# Patient Record
Sex: MALE | Race: WHITE | Employment: FULL TIME | URBAN - METROPOLITAN AREA
[De-identification: names, ages, dates, MRNs, and addresses within clinical notes are randomized per-mention and may not be internally consistent; named-entity substitution may affect disease eponyms.]

---

## 2018-06-27 PROBLEM — E66.01 OBESITY, MORBID (HCC): Status: ACTIVE | Noted: 2018-06-27

## 2020-10-26 RX ORDER — ATORVASTATIN CALCIUM 40 MG/1
TABLET, FILM COATED ORAL
Qty: 90 TAB | Refills: 3 | Status: SHIPPED | OUTPATIENT
Start: 2020-10-26 | End: 2021-10-21

## 2021-09-21 ENCOUNTER — OFFICE VISIT (OUTPATIENT)
Dept: INTERNAL MEDICINE CLINIC | Age: 66
End: 2021-09-21
Payer: OTHER GOVERNMENT

## 2021-09-21 VITALS
SYSTOLIC BLOOD PRESSURE: 180 MMHG | HEART RATE: 74 BPM | DIASTOLIC BLOOD PRESSURE: 100 MMHG | RESPIRATION RATE: 20 BRPM | TEMPERATURE: 97.3 F | WEIGHT: 279 LBS | HEIGHT: 69 IN | BODY MASS INDEX: 41.32 KG/M2 | OXYGEN SATURATION: 96 %

## 2021-09-21 DIAGNOSIS — E78.2 MIXED HYPERLIPIDEMIA: ICD-10-CM

## 2021-09-21 DIAGNOSIS — R63.5 WEIGHT GAIN: ICD-10-CM

## 2021-09-21 DIAGNOSIS — I10 HTN (HYPERTENSION), MALIGNANT: ICD-10-CM

## 2021-09-21 DIAGNOSIS — E66.01 MORBID OBESITY (HCC): ICD-10-CM

## 2021-09-21 DIAGNOSIS — C61 PROSTATE CANCER (HCC): ICD-10-CM

## 2021-09-21 DIAGNOSIS — E11.65 UNCONTROLLED TYPE 2 DIABETES MELLITUS WITH HYPERGLYCEMIA (HCC): ICD-10-CM

## 2021-09-21 DIAGNOSIS — G47.33 OBSTRUCTIVE SLEEP APNEA SYNDROME: ICD-10-CM

## 2021-09-21 DIAGNOSIS — Z00.00 ANNUAL PHYSICAL EXAM: Primary | ICD-10-CM

## 2021-09-21 LAB — HBA1C MFR BLD HPLC: 9 %

## 2021-09-21 PROCEDURE — 82044 UR ALBUMIN SEMIQUANTITATIVE: CPT | Performed by: INTERNAL MEDICINE

## 2021-09-21 PROCEDURE — 3052F HG A1C>EQUAL 8.0%<EQUAL 9.0%: CPT | Performed by: INTERNAL MEDICINE

## 2021-09-21 PROCEDURE — 99215 OFFICE O/P EST HI 40 MIN: CPT | Performed by: INTERNAL MEDICINE

## 2021-09-21 PROCEDURE — 99396 PREV VISIT EST AGE 40-64: CPT | Performed by: INTERNAL MEDICINE

## 2021-09-21 PROCEDURE — 83036 HEMOGLOBIN GLYCOSYLATED A1C: CPT | Performed by: INTERNAL MEDICINE

## 2021-09-21 RX ORDER — HYDROCHLOROTHIAZIDE 25 MG/1
25 TABLET ORAL DAILY
Qty: 90 TABLET | Refills: 1 | Status: SHIPPED | OUTPATIENT
Start: 2021-09-21 | End: 2022-01-12 | Stop reason: SDUPTHER

## 2021-09-21 RX ORDER — METFORMIN HYDROCHLORIDE 1000 MG/1
1000 TABLET ORAL 2 TIMES DAILY WITH MEALS
Qty: 60 TABLET | Refills: 3 | Status: SHIPPED | OUTPATIENT
Start: 2021-09-21 | End: 2022-01-12 | Stop reason: SDUPTHER

## 2021-09-21 RX ORDER — AMLODIPINE BESYLATE AND BENAZEPRIL HYDROCHLORIDE 10; 40 MG/1; MG/1
1 CAPSULE ORAL DAILY
Qty: 90 CAPSULE | Refills: 1 | Status: SHIPPED | OUTPATIENT
Start: 2021-09-21 | End: 2022-01-12 | Stop reason: SDUPTHER

## 2021-09-21 NOTE — PROGRESS NOTES
Fingerstick for HBa1C done in right  middle finger by Emily Her LPN per order of Celi Barone MD after cleaning area with alcohol wipe. Patient tolerated procedure well.

## 2021-09-21 NOTE — PROGRESS NOTES
1. Annual physical exam  Annual physical exam was performed. He had a colonoscopy just a few years ago which was normal.  All of his vaccines are up-to-date. He just had his PSA checked we will check a CBC  - CBC WITH AUTOMATED DIFF    2. Malignant HTN  This is a fairly urgent diagnosis which requires further work-up. He has been off of his blood pressure meds for quite some time. Given the increased episodes of headaches he is having end-diastolic pressures of 202 and greater he needs to be placed emergently back on his medications. I am also checking an EKG. We will start Lotrel 10/40 mg daily which she is supposed to start tonight at 50% of the dose. Restart hydrochlorothiazide as well. We will get him back in the office for blood pressure check as well. Of note I personally explained to him that I do want to avoid a precipitous drop in blood pressure. He is going to start half of his benazepril daily starting tonight he will then add hydrochlorothiazide and take these for 3 days. He will then increase to a full dose of the benazepril  - METABOLIC PANEL, COMPREHENSIVE  - EKG, 12 LEAD, INITIAL; Future  - amLODIPine-benazepril (LOTREL) 10-40 mg per capsule; Take 1 Capsule by mouth daily. Dispense: 90 Capsule; Refill: 1  - hydroCHLOROthiazide (HYDRODIURIL) 25 mg tablet; Take 1 Tablet by mouth daily. Dispense: 90 Tablet; Refill: 1    3. Obstructive sleep apnea syndrome  Also has a possibility of what might be contributing to his headaches could be the fact that his CPAP settings need to be readjusted. He is going to reach out to pulmonary    4. Mixed hyperlipidemia  Control unknown. Check lipid profile  - LIPID PANEL    5. Uncontrolled type 2 diabetes mellitus with hyperglycemia ([facility] [address]  I believe this is a new diagnosis. Given his weight gain of 20+ pounds and his not taking Metformin he is clearly crossed over into diabetes.   We will reassess this further at our follow-up but in the meantime we will start Januvia 50 mg daily as well as Metformin 1 g twice daily hemoglobin A1c was 9  - AMB POC HEMOGLOBIN A1C  - AMB POC URINE, MICROALBUMIN, SEMIQUANTITATIVE  - metFORMIN (GLUCOPHAGE) 1,000 mg tablet; Take 1 Tablet by mouth two (2) times daily (with meals). Dispense: 60 Tablet; Refill: 3  - SITagliptin (JANUVIA) 50 mg tablet; Take 1 Tablet by mouth daily. Dispense: 30 Tablet; Refill: 3  - COLLECTION CAPILLARY BLOOD SPECIMEN    6. Morbid obesity ([address]  He is started seeing a nutritionist.  I am to check a TSH given his weight gain    7. Weight gain  TSH  - TSH 3RD GENERATION    8. Prostate cancer [facility])  The patient is status post prostatectomy with nearly undetectable PSAs. This will need to be monitored yearly    Urology note from thierry dubon 7/20/21 reviewed by me  Personally interpreted an EKG that was performed on December 18, 2015 my reading is as follows normal sinus rhythm rate of 75 no ST segment changes no unusual Q waves  Labs from numerous encounters reviewed by me  Results for Eloy Torres (MRN [id number]) as of 9/21/2021 16:58   Ref.  Range 4/5/2019 13:11 7/6/2020 13:52 7/13/2020 15:05 7/5/2021 00:00 9/21/2021 16:40   Specific gravity (UA POC) Latest Ref Range: 1.001 - 1.035  1.025  1.025     pH (UA POC) Latest Ref Range: 4.6 - 8.0  6.0  8.0     Protein (UA POC) Latest Ref Range: Negative  Negative  Negative     Glucose (UA POC) Latest Ref Range: Negative  Negative  Negative     Ketones (UA POC) Latest Ref Range: Negative  Trace  Negative     Blood (UA POC) Latest Ref Range: Negative  Negative  Negative     Bilirubin (UA POC) Latest Ref Range: Negative  Negative  Negative     Urobilinogen (UA POC) Latest Ref Range: 0.2 - 1  1 mg/dL  2 mg/dL     Nitrites (UA POC) Latest Ref Range: Negative  Negative  Negative     Leukocyte esterase (UA POC) Latest Ref Range: Negative  Negative  Trace     Hemoglobin A1c (POC) Latest Units: %     9.0   Prostate Specific Ag Latest Ref Range: 0.0 - 4.0 ng/mL <0.03 <0.014 <0.014    CYTOLOGY NON GYN, UROLOGY [facility] Unknown Rpt       PROSTATE SPECIFIC ANTIGEN, TOTAL (PSA) Unknown Rpt         Chief Complaint   Patient presents with    Elbow Pain    uncontrolled htn, annual physical, hld, elevated blood suger, weight gain    HPI   This is a 82-year-old gentleman with a history of morbid obesity and obstructive sleep apnea as well as hypertension who was lost to follow-up. I last saw him in April 2020. In that period of time he has allowed his blood pressure medications to run out. He has gained at least 20 to 25 pounds as well. He was originally placed on the schedule for a brief sick visit but upon realizing how long it had been since he followed up we changed this to a new patient appointment. The patient reports that he has been having headaches on and off now for months. He suspected it might be his blood pressure. They did start after he stopped his blood pressure medicines. He has a history of postnasal drip and chronic sinusitis which he thought might be contributing to it the headache has 2 components one is a frontal headache that goes to the mid part of his skull. The second headache is a headache that is located over his trapezius muscles. The latter presents itself when he wakes from sleep. He continues to use a CPAP for his obstructive sleep apnea but has not had the settings checked in a very long time. He denies any chest pain palpitations shortness of breath. He does report he sweats a lot. He denies any swelling in his legs or recent injuries to his legs. He has some mild chronic left elbow pain which he has had on and off for years. He otherwise reports he has been doing okay. He last saw his urologist a couple months ago. His PSA at that time was nearly undetectable.   Patient Active Problem List   Diagnosis Code    Prostate cancer [facility]) C61    Erectile dysfunction following radical prostatectomy N52.31    Hayfever J30.1    Hypertension I10    Pneumonia J18.9    Sleep apnea G47.30    CONNIE (stress urinary incontinence), male N39.3    Obesity, morbid ([facility]) E66.01        Current Outpatient Medications on File Prior to Visit   Medication Sig Dispense Refill    atorvastatin (LIPITOR) 40 mg tablet TAKE 1 TABLET DAILY 90 Tab 3    beclomethasone dipropionate (QNASL) 80 mcg/actuation HFAA by Nasal route.  fexofenadine (ALLEGRA) 60 mg tablet Take  by mouth daily.  aspirin 81 mg tablet Take 81 mg by mouth.  Omega-3-DHA-EPA-Fish Oil 1,000 (120-180) mg Cap Take  by mouth.  atenolol (TENORMIN) 25 mg tablet  (Patient not taking: Reported on 9/21/2021)       No current facility-administered medications on file prior to visit. ROS  - GEN: +weight gain, no fevers or chills  - HEENT: no vision changes, no tinnitus, no sore throat  - CV: no cp, palpitations or edema  - RESP: no sob, cough  - ABD: no n/v/d, no blood in stool  - : no dysuria or changes in freq. - SKIN: no rashes, ulcers  - Neuro: no resting tremors, parasthesia in extremities, + headaches  - MS: No weakness in extremities, no gait abnormalities  - Psych: negative for depression or anxiety      Visit Vitals  BP (!) 180/100   Pulse 74   Temp 97.3 °F (36.3 °C)   Resp 20   Ht 5' 9\" (1.753 m)   Wt 279 lb (126.6 kg)   SpO2 96%   BMI 41.20 kg/m²           Physical Exam  Constitutional:       Appearance: Normal appearance. Morbidly obese. NAD and pleasant  HENT:      Head: Normocephalic. Nose: Nose normal.      Mouth/Throat:      Mouth: Mucous membranes are moist. Throat not inflammed  Eyes:      Extraocular Movements: Extraocular movements intact. Conjunctiva/sclera: Conjunctivae normal. Sclera anicteric     Pupils: Pupils are equal, round, and reactive to light. Cardiovascular:      Rate and Rhythm: Normal rate and regular rhythm. Pulses: Normal pulses. Pulmonary:      Effort: No respiratory distress. Breath sounds: CTAB and No stridor.  No rhonchi. Abdominal:      General: There is no distension. NT, ND  Neurological:      Mental Status: patient is alert and oriented times 3.  No resting tremor, normal gait     Cranial Nerves: cranial nerves grossly intact  Muskuloskeletal     Full ROM in extremities     Normal gait  Skin     Dry without lesions on examined areas, warm to the touch       Deferred  Psychiatry     Calm, normal affect, interacting normally

## 2021-09-21 NOTE — PROGRESS NOTES
Left elbow pain since June as well as headaches. Would like to have his prostate monitored. Karyn Elizondo presents today for   Chief Complaint   Patient presents with    Elbow Pain       Is someone accompanying this pt? noIs the patient using any DME equipment during OV? no    Depression Screening:  3 most recent PHQ Screens 9/21/2021   Little interest or pleasure in doing things Not at all   Feeling down, depressed, irritable, or hopeless More than half the days   Total Score PHQ 2 2       Learning Assessment:  No flowsheet data found. Fall Risk  No flowsheet data found. ADL  ADL Assessment 9/21/2021   Feeding yourself No Help Needed   Getting from bed to chair No Help Needed   Getting dressed No Help Needed   Bathing or showering No Help Needed   Walk across the room (includes cane/walker) No Help Needed   Using the telphone No Help Needed   Taking your medications No Help Needed   Preparing meals No Help Needed   Managing money (expenses/bills) No Help Needed   Moderately strenuous housework (laundry) No Help Needed   Shopping for personal items (toiletries/medicines) No Help Needed   Shopping for groceries No Help Needed   Driving No Help Needed   Climbing a flight of stairs No Help Needed   Getting to places beyond walking distances No Help Needed       Health Maintenance reviewed and discussed and ordered per Provider. Health Maintenance Due   Topic Date Due    Hepatitis C Screening  Never done    Lipid Screen  Never done    DTaP/Tdap/Td series (1 - Tdap) Never done    Colorectal Cancer Screening Combo  Never done    Shingrix Vaccine Age 50> (1 of 2) Never done    Flu Vaccine (1) Never done   . Coordination of Care:  1. Have you been to the ER, urgent care clinic since your last visit? Hospitalized since your last visit? no    2. Have you seen or consulted any other health care providers outside of the [address] [place] [address] since your last visit?  Include any pap smears or colon screening.  no

## 2021-09-22 LAB
CREATININE, URINE POC: NORMAL MG/DL
MICROALBUMIN UR TEST STR-MCNC: NORMAL MG/L
MICROALBUMIN/CREAT RATIO POC: NORMAL MG/G

## 2021-09-24 ENCOUNTER — HOSPITAL ENCOUNTER (OUTPATIENT)
Dept: NON INVASIVE DIAGNOSTICS | Age: 66
Discharge: HOME | End: 2021-09-24
Payer: OTHER GOVERNMENT

## 2021-09-24 DIAGNOSIS — I10 HTN (HYPERTENSION), MALIGNANT: ICD-10-CM

## 2021-09-24 LAB
ATRIAL RATE: 77 BPM
CALCULATED P AXIS, ECG09: 28 DEGREES
CALCULATED R AXIS, ECG10: 10 DEGREES
CALCULATED T AXIS, ECG11: 33 DEGREES
DIAGNOSIS, 93000: NORMAL
P-R INTERVAL, ECG05: 170 MS
Q-T INTERVAL, ECG07: 362 MS
QRS DURATION, ECG06: 88 MS
QTC CALCULATION (BEZET), ECG08: 409 MS
VENTRICULAR RATE, ECG03: 77 BPM

## 2021-09-24 PROCEDURE — 93005 ELECTROCARDIOGRAM TRACING: CPT

## 2021-09-25 ENCOUNTER — HOSPITAL ENCOUNTER (OUTPATIENT)
Dept: LAB | Age: 66
Discharge: HOME | End: 2021-09-25
Payer: OTHER GOVERNMENT

## 2021-09-25 LAB
ALBUMIN SERPL-MCNC: 4.1 G/DL (ref 3.5–4.7)
ALBUMIN/GLOB SERPL: 1.1 {RATIO}
ALP SERPL-CCNC: 72 U/L (ref 38–126)
ALT SERPL-CCNC: 54 U/L (ref 3–72)
ANION GAP SERPL CALC-SCNC: 10 MMOL/L
AST SERPL W P-5'-P-CCNC: 38 U/L (ref 17–74)
BASOPHILS # BLD: 0.1 K/UL (ref 0–0.1)
BASOPHILS NFR BLD: 1 % (ref 0–2)
BILIRUB SERPL-MCNC: 1.1 MG/DL (ref 0.2–1)
BUN SERPL-MCNC: 17 MG/DL (ref 9–21)
BUN/CREAT SERPL: 17
CA-I BLD-MCNC: 9.3 MG/DL (ref 8.5–10.5)
CHLORIDE SERPL-SCNC: 99 MMOL/L (ref 94–111)
CO2 SERPL-SCNC: 28 MMOL/L (ref 21–33)
CREAT SERPL-MCNC: 1 MG/DL (ref 0.8–1.5)
DIFFERENTIAL METHOD BLD: ABNORMAL
EOSINOPHIL # BLD: 0.2 K/UL (ref 0–0.4)
EOSINOPHIL NFR BLD: 2 % (ref 0–5)
ERYTHROCYTE [DISTWIDTH] IN BLOOD BY AUTOMATED COUNT: 13.4 % (ref 11.6–14.5)
GLOBULIN SER CALC-MCNC: 3.6 G/DL
GLUCOSE SERPL-MCNC: 100 MG/DL (ref 70–110)
HCT VFR BLD AUTO: 51.7 % (ref 36–48)
HGB BLD-MCNC: 17.1 G/DL (ref 13–16)
IMM GRANULOCYTES # BLD AUTO: 0 K/UL (ref 0–0.04)
IMM GRANULOCYTES NFR BLD AUTO: 0 % (ref 0–0.5)
LYMPHOCYTES # BLD: 2.6 K/UL (ref 0.9–3.6)
LYMPHOCYTES NFR BLD: 33 % (ref 21–52)
MCH RBC QN AUTO: 29.4 PG (ref 24–34)
MCHC RBC AUTO-ENTMCNC: 33.1 G/DL (ref 31–37)
MCV RBC AUTO: 88.8 FL (ref 78–100)
MONOCYTES # BLD: 1 K/UL (ref 0.05–1.2)
MONOCYTES NFR BLD: 12 % (ref 3–10)
NEUTS SEG # BLD: 4 K/UL (ref 1.8–8)
NEUTS SEG NFR BLD: 52 % (ref 40–73)
NRBC # BLD: 0 K/UL (ref 0–0.01)
NRBC BLD-RTO: 0 PER 100 WBC
PLATELET # BLD AUTO: 233 K/UL (ref 135–420)
PMV BLD AUTO: 10.8 FL (ref 9.2–11.8)
POTASSIUM SERPL-SCNC: 3.8 MMOL/L (ref 3.2–5.1)
PROT SERPL-MCNC: 7.7 G/DL (ref 6.1–8.4)
RBC # BLD AUTO: 5.82 M/UL (ref 4.35–5.65)
SODIUM SERPL-SCNC: 137 MMOL/L (ref 135–145)
TSH SERPL DL<=0.05 MIU/L-ACNC: 1.36 UIU/ML (ref 0.35–6.2)
WBC # BLD AUTO: 7.8 K/UL (ref 4.6–13.2)

## 2021-09-25 PROCEDURE — 36415 COLL VENOUS BLD VENIPUNCTURE: CPT

## 2021-09-25 PROCEDURE — 84443 ASSAY THYROID STIM HORMONE: CPT

## 2021-09-25 PROCEDURE — 80061 LIPID PANEL: CPT

## 2021-09-25 PROCEDURE — 85025 COMPLETE CBC W/AUTO DIFF WBC: CPT

## 2021-09-25 PROCEDURE — 80053 COMPREHEN METABOLIC PANEL: CPT

## 2021-09-26 LAB
CHOLEST SERPL-MCNC: 89 MG/DL
HDLC SERPL-MCNC: 21 MG/DL (ref 40–60)
HDLC SERPL: 4.2 {RATIO} (ref 0–5)
LDLC SERPL CALC-MCNC: 36.2 MG/DL (ref 0–100)
LIPID PROFILE,FLP: ABNORMAL
TRIGL SERPL-MCNC: 159 MG/DL (ref ?–150)
VLDLC SERPL CALC-MCNC: 31.8 MG/DL

## 2021-09-29 ENCOUNTER — TELEPHONE (OUTPATIENT)
Dept: INTERNAL MEDICINE CLINIC | Age: 66
End: 2021-09-29

## 2021-09-29 NOTE — TELEPHONE ENCOUNTER
Patient spoke with his pulmonary Doctor and he stated patient does not need to change any of his settings with his CPAP machine bc he is only having 1 episode per hour.

## 2021-10-12 ENCOUNTER — OFFICE VISIT (OUTPATIENT)
Dept: INTERNAL MEDICINE CLINIC | Age: 66
End: 2021-10-12
Payer: OTHER GOVERNMENT

## 2021-10-12 VITALS
DIASTOLIC BLOOD PRESSURE: 83 MMHG | BODY MASS INDEX: 39.69 KG/M2 | SYSTOLIC BLOOD PRESSURE: 137 MMHG | TEMPERATURE: 97.6 F | RESPIRATION RATE: 20 BRPM | WEIGHT: 268 LBS | HEART RATE: 78 BPM | OXYGEN SATURATION: 95 % | HEIGHT: 69 IN

## 2021-10-12 DIAGNOSIS — E66.9 OBESITY (BMI 35.0-39.9 WITHOUT COMORBIDITY): ICD-10-CM

## 2021-10-12 DIAGNOSIS — R94.31 ABNORMAL EKG: ICD-10-CM

## 2021-10-12 DIAGNOSIS — E11.65 UNCONTROLLED TYPE 2 DIABETES MELLITUS WITH HYPERGLYCEMIA (HCC): ICD-10-CM

## 2021-10-12 DIAGNOSIS — I10 ESSENTIAL HYPERTENSION: Primary | ICD-10-CM

## 2021-10-12 PROCEDURE — 99214 OFFICE O/P EST MOD 30 MIN: CPT | Performed by: INTERNAL MEDICINE

## 2021-10-12 PROCEDURE — 3052F HG A1C>EQUAL 8.0%<EQUAL 9.0%: CPT | Performed by: INTERNAL MEDICINE

## 2021-10-12 NOTE — PROGRESS NOTES
1. Essential hypertension  Pressure is now controlled. Continue, benazepril, Norvasc, beta-blocker and hydrochlorothiazide. He will need a repeat CMP in approximately 3 months    2. Uncontrolled type 2 diabetes mellitus with hyperglycemia (HCC)  Globin A1c of 9 reviewed with patient. He does need a referral to podiatry which I have ordered nonfasting blood sugar here in the office is 87. He denies any sweats or nonmovement induced dizziness. I have asked that he keep glucose tablets on hand and watch for signs of hypoglycemia  - REFERRAL TO PODIATRY    3. Obesity (BMI 35.0-39.9 without comorbidity)  He is already lost almost 10 pounds and I congratulated him on this. 4. Abnormal EKG  I reviewed his EKG and personally interpreted it. It is abnormal.  Given his uncontrolled diabetes uncontrolled hypertension he is at risk for cardiac event. I am ordering a treadmill stress test  - NUCLEAR CARDIAC STRESS TEST; Future    Ekg My interpretation Rate 77, slight st elevation noted in inferior Leads. No pathologic q waves noted  Labs reviewed  Total time spent on this encounter was approximately 20 minutes. Approximately 15 minutes were spent at the bedside and the remainder of the time was reviewing his medical record  Chief Complaint   Patient presents with    Follow-up     4 week follow up        HPI   This is a delightful 79-year-old gentleman who presented to me originally 4 weeks ago at which time it was noted he had malignant hypertension and poorly controlled diabetes. Hemoglobin A1c indicated very poor blood glucose control. At that time he was started on Januvia and Metformin. Additionally his Norvasc/benazepril was restarted hydrochlorothiazide was added and a beta-blockade was initiated. Since then the patient reports he feels much better. His headaches are gone. He denies any chest pain palpitations or shortness of breath.   He reports that he is lost almost 10 pounds and part of it is that his wife is really forcing him to eat better. Quite frankly he reports he feels better than he has felt in a long time. He had one episode of dizziness when he went from a sitting position to a standing position but it passed fairly quickly. He denies any diaphoresis or other lightheadedness that would be consistent with hypoglycemia. He otherwise reports he has been doing well and has no other complaints. Patient Active Problem List   Diagnosis Code    Prostate cancer ([address] C61    Erectile dysfunction following radical prostatectomy N52.31    Hayfever J30.1    Hypertension I10    Pneumonia J18.9    Sleep apnea G47.30    CONNIE (stress urinary incontinence), male N39.3    Obesity, morbid ([address] E66.01        Current Outpatient Medications on File Prior to Visit   Medication Sig Dispense Refill    amLODIPine-benazepril (LOTREL) 10-40 mg per capsule Take 1 Capsule by mouth daily. 90 Capsule 1    hydroCHLOROthiazide (HYDRODIURIL) 25 mg tablet Take 1 Tablet by mouth daily. 90 Tablet 1    metFORMIN (GLUCOPHAGE) 1,000 mg tablet Take 1 Tablet by mouth two (2) times daily (with meals). 60 Tablet 3    SITagliptin (JANUVIA) 50 mg tablet Take 1 Tablet by mouth daily. 30 Tablet 3    atorvastatin (LIPITOR) 40 mg tablet TAKE 1 TABLET DAILY 90 Tab 3    atenolol (TENORMIN) 25 mg tablet       beclomethasone dipropionate (QNASL) 80 mcg/actuation HFAA by Nasal route.  fexofenadine (ALLEGRA) 60 mg tablet Take  by mouth daily.  aspirin 81 mg tablet Take 81 mg by mouth.  Omega-3-DHA-EPA-Fish Oil 1,000 (120-180) mg Cap Take  by mouth. No current facility-administered medications on file prior to visit.         ROS  - GEN: + weight loss, no fevers or chills  - HEENT: no vision changes, no tinnitus, no sore throat  - CV: no cp, palpitations or edema  - RESP: no sob, cough        - Neuro: no resting tremors, parasthesia in extremities, no headaches  - MS: No weakness in extremities, no gait abnormalities  - Psych: negative for depression or anxiety      Visit Vitals  /83   Pulse 78   Temp 97.6 °F (36.4 °C)   Resp 20   Ht 5' 9\" (1.753 m)   Wt 268 lb (121.6 kg)   SpO2 95%   BMI 39.58 kg/m²           Physical Exam  Constitutional:       Appearance: Normal appearance. obese. NAD and pleasant  HENT:      Head: Normocephalic. Nose: Nose normal.      Mouth/Throat:     Eyes:      Extraocular Movements: Extraocular movements intact. Conjunctiva/sclera: Conjunctivae normal. Sclera anicteric    Cardiovascular:      Rate and Rhythm: Normal rate and regular rhythm. Pulses: Normal pulses. Pulmonary:      Effort: No respiratory distress. Breath sounds: CTAB and No stridor. No rhonchi.

## 2021-10-12 NOTE — PROGRESS NOTES
Patient states he is feeling much better. Mary Lou Gomez presents today for   Chief Complaint   Patient presents with    Follow-up     4 week follow up       Is someone accompanying this pt? no  Is the patient using any DME equipment during [address]? no    Depression Screening:  3 most recent PHQ Screens 10/12/2021   Little interest or pleasure in doing things Not at all   Feeling down, depressed, irritable, or hopeless Not at all   Total Score PHQ 2 0       Learning Assessment:  No flowsheet data found. Fall Risk  No flowsheet data found. ADL  ADL Assessment 10/12/2021   Feeding yourself No Help Needed   Getting from bed to chair No Help Needed   Getting dressed No Help Needed   Bathing or showering No Help Needed   Walk across the room (includes cane/walker) No Help Needed   Using the telphone No Help Needed   Taking your medications No Help Needed   Preparing meals No Help Needed   Managing money (expenses/bills) No Help Needed   Moderately strenuous housework (laundry) No Help Needed   Shopping for personal items (toiletries/medicines) No Help Needed   Shopping for groceries No Help Needed   Driving No Help Needed   Climbing a flight of stairs No Help Needed   Getting to places beyond walking distances No Help Needed       Health Maintenance reviewed and discussed and ordered per Provider. Health Maintenance Due   Topic Date Due    Hepatitis C Screening  Never done    Foot Exam Q1  Never done    Eye Exam Retinal or Dilated  Never done    DTaP/Tdap/Td series (1 - Tdap) Never done    Colorectal Cancer Screening Combo  Never done    Shingrix Vaccine Age 50> (1 of 2) Never done   . Coordination of Care:  1. \"Have you been to the ER, urgent care clinic since your last visit? Hospitalized since your last visit? \" No    2. \"Have you seen or consulted any other health care providers outside of the [address] [place] [address] [place] since your last visit? \" No     3.  For patients aged 39-70: Has the patient had a colonoscopy?  yes

## 2021-10-15 ENCOUNTER — HOSPITAL ENCOUNTER (OUTPATIENT)
Dept: NUCLEAR MEDICINE | Age: 66
Discharge: HOME | End: 2021-10-15
Attending: INTERNAL MEDICINE
Payer: OTHER GOVERNMENT

## 2021-10-15 ENCOUNTER — HOSPITAL ENCOUNTER (OUTPATIENT)
Dept: NON INVASIVE DIAGNOSTICS | Age: 66
Discharge: HOME | End: 2021-10-15
Attending: INTERNAL MEDICINE
Payer: OTHER GOVERNMENT

## 2021-10-15 VITALS
DIASTOLIC BLOOD PRESSURE: 69 MMHG | BODY MASS INDEX: 38.95 KG/M2 | WEIGHT: 263 LBS | SYSTOLIC BLOOD PRESSURE: 130 MMHG | HEART RATE: 84 BPM | HEIGHT: 69 IN

## 2021-10-15 DIAGNOSIS — R94.31 ABNORMAL EKG: ICD-10-CM

## 2021-10-15 LAB
STRESS ANGINA INDEX: 0
STRESS BASELINE HR: 75 BPM
STRESS ESTIMATED WORKLOAD: 7 METS
STRESS EXERCISE DUR MIN: NORMAL
STRESS PEAK DIAS BP: 82 MMHG
STRESS PEAK SYS BP: 234 MMHG
STRESS PERCENT HR ACHIEVED: 94 %
STRESS POST PEAK HR: 148 BPM
STRESS RATE PRESSURE PRODUCT: NORMAL BPM*MMHG
STRESS SR DUKE TREADMILL SCORE: 0
STRESS ST DEPRESSION: 0 MM
STRESS ST ELEVATION: 0 MM
STRESS TARGET HR: 157 BPM

## 2021-10-15 PROCEDURE — 93017 CV STRESS TEST TRACING ONLY: CPT

## 2021-10-15 PROCEDURE — A9500 TC99M SESTAMIBI: HCPCS

## 2021-10-15 RX ORDER — TETRAKIS(2-METHOXYISOBUTYLISOCYANIDE)COPPER(I) TETRAFLUOROBORATE 1 MG/ML
35 INJECTION, POWDER, LYOPHILIZED, FOR SOLUTION INTRAVENOUS
Status: COMPLETED | OUTPATIENT
Start: 2021-10-15 | End: 2021-10-15

## 2021-10-15 RX ORDER — TETRAKIS(2-METHOXYISOBUTYLISOCYANIDE)COPPER(I) TETRAFLUOROBORATE 1 MG/ML
9.7 INJECTION, POWDER, LYOPHILIZED, FOR SOLUTION INTRAVENOUS
Status: COMPLETED | OUTPATIENT
Start: 2021-10-15 | End: 2021-10-15

## 2021-10-15 RX ADMIN — TETRAKIS(2-METHOXYISOBUTYLISOCYANIDE)COPPER(I) TETRAFLUOROBORATE 9.7 MILLICURIE: 1 INJECTION, POWDER, LYOPHILIZED, FOR SOLUTION INTRAVENOUS at 07:00

## 2021-10-15 RX ADMIN — TETRAKIS(2-METHOXYISOBUTYLISOCYANIDE)COPPER(I) TETRAFLUOROBORATE 35 MILLICURIE: 1 INJECTION, POWDER, LYOPHILIZED, FOR SOLUTION INTRAVENOUS at 11:00

## 2021-10-19 ENCOUNTER — TELEPHONE (OUTPATIENT)
Dept: INTERNAL MEDICINE CLINIC | Age: 66
End: 2021-10-19

## 2021-10-19 DIAGNOSIS — R94.39 ABNORMAL STRESS TEST: Primary | ICD-10-CM

## 2021-10-19 NOTE — TELEPHONE ENCOUNTER
----- Message from Lashay Singleton MD sent at 10/15/2021  4:35 PM EDT -----  His stress test does not show any acute ischemia. However it does show a potential perfusion deficit. It is very small and could just be artifact. That being said I would like to have him evaluated by cardiology. Please place a referral for [address] [place] [address] cardiology.   Diagnosis abnormal EKG and abnormal stress test

## 2021-10-21 RX ORDER — ATORVASTATIN CALCIUM 40 MG/1
TABLET, FILM COATED ORAL
Qty: 90 TABLET | Refills: 3 | Status: SHIPPED | OUTPATIENT
Start: 2021-10-21 | End: 2022-01-12 | Stop reason: SDUPTHER

## 2021-11-22 ENCOUNTER — OFFICE VISIT (OUTPATIENT)
Dept: CARDIOLOGY CLINIC | Age: 66
End: 2021-11-22
Payer: OTHER GOVERNMENT

## 2021-11-22 VITALS
HEIGHT: 69 IN | WEIGHT: 265 LBS | HEART RATE: 78 BPM | SYSTOLIC BLOOD PRESSURE: 126 MMHG | OXYGEN SATURATION: 96 % | BODY MASS INDEX: 39.25 KG/M2 | DIASTOLIC BLOOD PRESSURE: 70 MMHG

## 2021-11-22 DIAGNOSIS — R06.09 DOE (DYSPNEA ON EXERTION): Primary | ICD-10-CM

## 2021-11-22 DIAGNOSIS — I27.20 PULMONARY HTN (HCC): ICD-10-CM

## 2021-11-22 PROCEDURE — 99204 OFFICE O/P NEW MOD 45 MIN: CPT | Performed by: INTERNAL MEDICINE

## 2021-11-22 NOTE — PROGRESS NOTES
[facility]    Mr. Rizwana Tejeda is a 64-year male with a history of diabetes, hypertension, hyperlipidemia and prostate cancer    Patient is here today for cardiac evaluation. He denies any prior history of MI or CHF. Patient was sent to me for the evaluation of stress test finding abnormal results. Patient denies any resting or exertional chest pain or chest tightness. He denies any palpitation, presyncope or syncope. Recently patient has been diagnosed with diabetes and because of risk factor, he underwent stress test which was found to be abnormal for him to PCP and was sent to me. Patient is able to perform activity of daily living without any significant limitation. He does have some fatigue and tiredness after walking and mild dyspnea but does not appear to have any chest pain or chest tightness. He can climb 4 flight of stairs without having to stop. On and off he has some lower extremity swelling however has not had any significant swelling recently  Denies any nausea, vomiting, abdominal pain, fever, chills, sputum production. No hematuria or other bleeding complaints    Past Medical History:   Diagnosis Date    Hayfever     Hypertension     Malignant neoplasm prostate ([facility] 03-    Pneumonia 1985 or 1986    [facility]     [id number] CaP 3+3    Sleep apnea     CONNIE (stress urinary incontinence), male        Review of Systems:  Cardiac symptoms as noted above in HPI. All others negative. Current Outpatient Medications   Medication Sig    atorvastatin (LIPITOR) 40 mg tablet TAKE 1 TABLET DAILY    amLODIPine-benazepril (LOTREL) 10-40 mg per capsule Take 1 Capsule by mouth daily.  metFORMIN (GLUCOPHAGE) 1,000 mg tablet Take 1 Tablet by mouth two (2) times daily (with meals).  SITagliptin (JANUVIA) 50 mg tablet Take 1 Tablet by mouth daily.     atenolol (TENORMIN) 25 mg tablet     fexofenadine (ALLEGRA) 60 mg tablet Take  by mouth daily.  aspirin 81 mg tablet Take 81 mg by mouth.  Omega-3-DHA-EPA-Fish Oil 1,000 (120-180) mg Cap Take  by mouth.  hydroCHLOROthiazide (HYDRODIURIL) 25 mg tablet Take 1 Tablet by mouth daily.  beclomethasone dipropionate (QNASL) 80 mcg/actuation HFAA by Nasal route. No current facility-administered medications for this visit. Past Surgical History:   Procedure Laterality Date    BIOPSY PROSTATE  3/2/11    HX HERNIA REPAIR  12/2015    HX RADICAL PROSTATECTOMY  5/6/2011    [place] APPENDECTOMY  5/1977    VASECTOMY  1990 or 1991       Allergies and Sensitivities:  Allergies   Allergen Reactions    Hay Fever And Allergy Relief Runny Nose and Cough       Family History:  Family History   Problem Relation Age of Onset    Cancer Father         lung cancer    Prostate Cancer Father     Other Father         vision impaired    Hypertension Mother     Obesity Mother     Hearing Impairment Mother     Other Mother         vision impair    Hypertension Sister        Social History:  Social History     Tobacco Use    Smoking status: Never Smoker    Smokeless tobacco: Never Used   Vaping Use    Vaping Use: Never used   Substance Use Topics    Alcohol use: Yes     Comment: occ    Drug use: No     He  reports that he has never smoked. He has never used smokeless tobacco.  He  reports current alcohol use. Physical Exam:  BP Readings from Last 3 Encounters:   11/22/21 126/70   10/15/21 130/69   10/12/21 137/83         Pulse Readings from Last 3 Encounters:   11/22/21 78   10/15/21 84   10/12/21 78          Wt Readings from Last 3 Encounters:   11/22/21 120.2 kg (265 lb)   10/15/21 119.3 kg (263 lb)   10/12/21 121.6 kg (268 lb)       Constitutional: Oriented to person, place, and time. HENT: Head: Normocephalic and atraumatic. Eyes: Conjunctivae and extraocular motions are normal.   Neck: No JVD present. Carotid bruit is not appreciated.    Cardiovascular: Regular rhythm. No murmur, gallop or rubs appreciated  Lung: Breath sounds normal. No respiratory distress. No ronchi or rales appreciated  Abdominal: No tenderness. No rebound and no guarding. Musculoskeletal: There is no lower extremity edema. No cynosis  Lymphadenopathy:  No cervical or supraclavicular adenopathy appriciated. Neurological: No gross motor deficit noted. Skin: No visible skin rash noted. No Ear discharge noted    LABS:   @  Lab Results   Component Value Date/Time    WBC 7.8 2021 09:55 AM    HGB 17.1 (H) 2021 09:55 AM    HCT 51.7 (H) 2021 09:55 AM    PLATELET 649  09:55 AM    MCV 88.8 2021 09:55 AM     Lab Results   Component Value Date/Time    Sodium 137 2021 09:55 AM    Potassium 3.8 2021 09:55 AM    Chloride 99 2021 09:55 AM    CO2 28 2021 09:55 AM    Glucose 100 2021 09:55 AM    BUN 17 2021 09:55 AM    Creatinine 1.00 2021 09:55 AM     Lipids Latest Ref Rng & Units 2021   Chol, Total <200 mg/dL 89   HDL 40 - 60 mg/dL 21(L)   LDL 0 - 100 mg/dL 36.2   Trig <150 mg/dL 159(H)   Chol/HDL Ratio 0 - 5.0   4.2   Some recent data might be hidden     Lab Results   Component Value Date/Time    ALT (SGPT) 54 2021 09:55 AM     Lab Results   Component Value Date/Time    Hemoglobin A1c (POC) 9.0 2021 04:40 PM     Lab Results   Component Value Date/Time    TSH 1.36 2021 09:55 AM     EK2021: Sinus rhythm. No Q waves. Normal [place] and QRS interval.  Likely early repolarization    NUCLEAR CARDIAC STRESS TEST 10/15/2021, 10/15/2021 10/15/2021  Interpretation Summary  · Baseline ECG: Normal sinus rhythm, non-specific ST-T wave abnormalities. · Stress test: Stress EKG normal. Low risk Duke treadmill score. Exercise stress test: EKG stress test results correlate with an intermediate risk of inducible myocardial ischemia supported by the following factors: multiple CV risk factors.  Further cardiac evaluation for ischemic heart disease could be considered, especially in the setting of progressive or typical angina. · SPECT: Left ventricular function post-stress was normal. Calculated ejection fraction is 50% (normal EF value is equal to or greater than 50%). · SPECT: Myocardial perfusion imaging defect 1: There is a defect that is small in size of the inferior wall(s) that is predominantly fixed. There is normal wall motion in the defect area. The defect appears to be an artifact. · SPECT: Left ventricular perfusion is equivocal. Myocardial perfusion imaging supports a low risk stress test.    STRESS TEST (EST, PHARM, NUC, ECHO etc)    CATHETERIZATION    IMPRESSION & PLAN:  Mr. Simin Wilkins is a 82-year-old male    Hypertension:  /70. Currently on amlodipine, benazepril, hydrochlorothiazide and atenolol. Echo ordered to rule out hypertensive cardiovascular heart disease especially with some fatigue and dyspnea  Nonpharmacologic intervention for hypertension discussed with patient in detail especially salt restriction, weight loss, aerobic exercise and dietary modification. Hyperlipidemia:  Currently on atorvastatin 40 mg daily. Total cholesterol 89 and LDL was 36. Reasonable to consider decreasing dose to 20 mg    Diabetes:  Goal hemoglobin A1c less than 7 is recommended from cardiovascular standpoint. Last hemoglobin A1c was 9. Now patient is on Metformin    Sleep apnea:  Using CPAP machine. Weight loss advised. Will order echo to rule out pulmonary hypertension    Obesity:  Importance of diet and exercise was discussed with patient. Max weight was 285 and now with dietary modification is down to 265. He will continue to work on his diet and exercise program    This plan was discussed with patient who is in agreement. Thank you for allowing me to participate in patient care. Please feel free to call me if you have any question or concern. Tiera Adams MD  Please note:  This document has been produced using voice recognition software. Unrecognized errors in transcription may be present.

## 2021-11-22 NOTE — LETTER
11/22/2021    Patient: Felice Villa   YOB: 1955   Date of Visit: 11/22/2021     Liv Renae MD  [address] [facility] [address]  Via In Basket    Dear Liv Renae MD,      Thank you for referring Mr. Ron Abraham to [address] [place] [address] for evaluation. My notes for this consultation are attached. If you have questions, please do not hesitate to call me. I look forward to following your patient along with you.       Sincerely,    Rebeca Hunter MD

## 2021-11-24 ENCOUNTER — TELEPHONE (OUTPATIENT)
Dept: INTERNAL MEDICINE CLINIC | Age: 66
End: 2021-11-24

## 2021-11-24 DIAGNOSIS — E11.65 UNCONTROLLED TYPE 2 DIABETES MELLITUS WITH HYPERGLYCEMIA (HCC): Primary | ICD-10-CM

## 2021-11-24 NOTE — TELEPHONE ENCOUNTER
Patient called and states that his Januvia is 400+$ and wants a generic form sent. There is no generic for Januvia.

## 2021-11-30 NOTE — TELEPHONE ENCOUNTER
Is this supposed to be Jardiance that gets sent in? Alicia Melton is the one that is too expensive. Thank You.

## 2022-01-12 ENCOUNTER — VIRTUAL VISIT (OUTPATIENT)
Dept: INTERNAL MEDICINE CLINIC | Age: 67
End: 2022-01-12
Payer: OTHER GOVERNMENT

## 2022-01-12 DIAGNOSIS — I10 ESSENTIAL HYPERTENSION: Primary | ICD-10-CM

## 2022-01-12 DIAGNOSIS — I10 HTN (HYPERTENSION), MALIGNANT: ICD-10-CM

## 2022-01-12 DIAGNOSIS — G47.33 OBSTRUCTIVE SLEEP APNEA SYNDROME: ICD-10-CM

## 2022-01-12 DIAGNOSIS — E66.01 OBESITY, MORBID (HCC): ICD-10-CM

## 2022-01-12 DIAGNOSIS — E11.65 UNCONTROLLED TYPE 2 DIABETES MELLITUS WITH HYPERGLYCEMIA (HCC): ICD-10-CM

## 2022-01-12 DIAGNOSIS — E78.2 MIXED HYPERLIPIDEMIA: ICD-10-CM

## 2022-01-12 DIAGNOSIS — J30.1 SEASONAL ALLERGIC RHINITIS DUE TO POLLEN: ICD-10-CM

## 2022-01-12 PROCEDURE — 99443 PR PHYS/QHP TELEPHONE EVALUATION 21-30 MIN: CPT | Performed by: INTERNAL MEDICINE

## 2022-01-12 RX ORDER — HYDROCHLOROTHIAZIDE 25 MG/1
25 TABLET ORAL DAILY
Qty: 90 TABLET | Refills: 1 | Status: SHIPPED | OUTPATIENT
Start: 2022-01-12 | End: 2022-08-30 | Stop reason: SDUPTHER

## 2022-01-12 RX ORDER — FEXOFENADINE HCL 60 MG
180 TABLET ORAL DAILY
Qty: 90 TABLET | Refills: 1 | Status: SHIPPED | OUTPATIENT
Start: 2022-01-12

## 2022-01-12 RX ORDER — ATENOLOL 25 MG/1
25 TABLET ORAL DAILY
Qty: 90 TABLET | Refills: 1 | Status: SHIPPED | OUTPATIENT
Start: 2022-01-12 | End: 2022-09-29

## 2022-01-12 RX ORDER — AMLODIPINE BESYLATE AND BENAZEPRIL HYDROCHLORIDE 10; 40 MG/1; MG/1
1 CAPSULE ORAL DAILY
Qty: 90 CAPSULE | Refills: 1 | Status: SHIPPED | OUTPATIENT
Start: 2022-01-12 | End: 2022-08-30 | Stop reason: SDUPTHER

## 2022-01-12 RX ORDER — ATORVASTATIN CALCIUM 40 MG/1
TABLET, FILM COATED ORAL
Qty: 90 TABLET | Refills: 1 | Status: SHIPPED | OUTPATIENT
Start: 2022-01-12

## 2022-01-12 RX ORDER — METFORMIN HYDROCHLORIDE 1000 MG/1
1000 TABLET ORAL 2 TIMES DAILY WITH MEALS
Qty: 180 TABLET | Refills: 1 | Status: SHIPPED | OUTPATIENT
Start: 2022-01-12 | End: 2022-01-19

## 2022-01-12 NOTE — PROGRESS NOTES
I was at my office in [place], [place] while conducting this encounter. The patient is at home. Consent:  Patient and/or HIS/HER healthcare decision maker is aware that this patient-initiated Telehealth encounter is a billable service, with coverage as determined by patient's insurance carrier. Patient is aware that He/She may receive a bill and has provided verbal consent to proceed: Consent has been obtained within past 12 months of the date of this encounter. This virtual visit was conducted via Telephone    1. Uncontrolled type 2 diabetes mellitus with hyperglycemia (HCC)  His last hemoglobin A1c is 9 and he is due for recheck. He has been compliant with Jardiance and high-dose metformin. He is also working on his weight. I am going to recheck a hemoglobin A1c and a CMP  - empagliflozin (Jardiance) 25 mg tablet; Take 1 Tablet by mouth daily. Dispense: 90 Tablet; Refill: 1  - HEMOGLOBIN A1C WITH EAG  - METABOLIC PANEL, COMPREHENSIVE  - metFORMIN (GLUCOPHAGE) 1,000 mg tablet; Take 1 Tablet by mouth two (2) times daily (with meals). Dispense: 180 Tablet; Refill: 1    2. Essential hypertension  Continue Tenormin as well as Norvasc and benazepril and hydrochlorothiazide  - METABOLIC PANEL, COMPREHENSIVE  - atenoloL (TENORMIN) 25 mg tablet; Take 1 Tablet by mouth daily. Dispense: 90 Tablet; Refill: 1    3. Obesity, morbid ([address]  He has been working very hard on his weight and is already lost several pounds. He has an appointment tonight with nutrition    4. Obstructive sleep apnea syndrome  StAble    5. HTN (hypertension)  #2  - amLODIPine-benazepril (LOTREL) 10-40 mg per capsule; Take 1 Capsule by mouth daily. Dispense: 90 Capsule; Refill: 1    6. Mixed hyperlipidemia  His last lipid profile was exceptional.  Continue Lipitor at current dose  - atorvastatin (LIPITOR) 40 mg tablet; TAKE 1 TABLET DAILY  Dispense: 90 Tablet; Refill: 1    7. Seasonal allergic rhinitis due to pollen  Stable.   Continue Allegra  - fexofenadine (Allegra) 60 mg tablet; Take 3 Tablets by mouth daily. Dispense: 90 Tablet; Refill: 1       Cc: followup dm, htn,     HPI   This is a very pleasant 60-year-old gentleman with a history of type 2 diabetes dyslipidemia hypertension as well as seasonal rhinitis who presents for follow-up. Phillip Larsen has been doing very well over the past few months. When I last spoke to him in September he was found to have a hemoglobin A1c of 9. At that time we added Januvia 25 mg and made sure he was taking his high-dose metformin. Since then he reports he is doing much better. He is lost at least 7 pounds and has an appointment with nutrition today. He has no other major complaints except for some difficulty holding his bladder which is chronic for him. He denies any headache vision changes chest pain palpitation shortness of breath or cough. The patient reports that he is hoping to get an appointment with Abner duarte given that I am leaving next week. He otherwise has absolutely no other complaints today  Current Outpatient Medications on File Prior to Visit   Medication Sig Dispense Refill    hydroCHLOROthiazide (HYDRODIURIL) 25 mg tablet Take 1 Tablet by mouth daily. 90 Tablet 1    beclomethasone dipropionate (QNASL) 80 mcg/actuation HFAA by Nasal route.  aspirin 81 mg tablet Take 81 mg by mouth.  Omega-3-DHA-EPA-Fish Oil 1,000 (120-180) mg Cap Take  by mouth. No current facility-administered medications on file prior to visit.         Patient Active Problem List   Diagnosis Code    Prostate cancer ([facility] C61    Erectile dysfunction following radical prostatectomy N52.31    Hayfever J30.1    Hypertension I10    Pneumonia J18.9    Sleep apnea G47.30    CONNIE (stress urinary incontinence), male N39.3    Obesity, morbid ([facility] E66.01             Total Time Spent on this Encounter: total time spent was approx 21 minutes

## 2022-01-14 ENCOUNTER — TELEPHONE (OUTPATIENT)
Dept: INTERNAL MEDICINE CLINIC | Age: 67
End: 2022-01-14

## 2022-01-14 ENCOUNTER — HOSPITAL ENCOUNTER (OUTPATIENT)
Dept: LAB | Age: 67
Discharge: HOME | End: 2022-01-14
Payer: OTHER GOVERNMENT

## 2022-01-14 LAB
ALBUMIN SERPL-MCNC: 4.8 G/DL (ref 3.5–4.7)
ALBUMIN/GLOB SERPL: 1.2 {RATIO}
ALP SERPL-CCNC: 65 U/L (ref 38–126)
ALT SERPL-CCNC: 50 U/L (ref 3–72)
ANION GAP SERPL CALC-SCNC: 12 MMOL/L
AST SERPL W P-5'-P-CCNC: 34 U/L (ref 17–74)
BILIRUB SERPL-MCNC: 1.2 MG/DL (ref 0.2–1)
BUN SERPL-MCNC: 17 MG/DL (ref 9–21)
BUN/CREAT SERPL: 17
CA-I BLD-MCNC: 10 MG/DL (ref 8.5–10.5)
CHLORIDE SERPL-SCNC: 99 MMOL/L (ref 94–111)
CO2 SERPL-SCNC: 25 MMOL/L (ref 21–33)
CREAT SERPL-MCNC: 1 MG/DL (ref 0.8–1.5)
GLOBULIN SER CALC-MCNC: 3.9 G/DL
GLUCOSE SERPL-MCNC: 86 MG/DL (ref 70–110)
POTASSIUM SERPL-SCNC: 3.4 MMOL/L (ref 3.2–5.1)
PROT SERPL-MCNC: 8.7 G/DL (ref 6.1–8.4)
SODIUM SERPL-SCNC: 136 MMOL/L (ref 135–145)

## 2022-01-14 PROCEDURE — 36415 COLL VENOUS BLD VENIPUNCTURE: CPT

## 2022-01-14 PROCEDURE — 80053 COMPREHEN METABOLIC PANEL: CPT

## 2022-01-14 PROCEDURE — 83036 HEMOGLOBIN GLYCOSYLATED A1C: CPT

## 2022-01-16 LAB
EST. AVERAGE GLUCOSE BLD GHB EST-MCNC: 126 MG/DL
HBA1C MFR BLD: 6 % (ref 4.2–5.6)

## 2022-01-31 DIAGNOSIS — E11.65 UNCONTROLLED TYPE 2 DIABETES MELLITUS WITH HYPERGLYCEMIA (HCC): ICD-10-CM

## 2022-01-31 RX ORDER — METFORMIN HYDROCHLORIDE 1000 MG/1
1000 TABLET ORAL 2 TIMES DAILY WITH MEALS
Qty: 60 TABLET | Refills: 1 | Status: SHIPPED | OUTPATIENT
Start: 2022-01-31

## 2022-01-31 NOTE — TELEPHONE ENCOUNTER
Received fax from [address]  Last appt w/Dr. Hallie Vela 1/12/22 has upcoming appt w/Ms Solis on 4/4/22

## 2022-02-10 ENCOUNTER — TELEPHONE (OUTPATIENT)
Dept: CARDIOLOGY CLINIC | Age: 67
End: 2022-02-10

## 2022-02-28 ENCOUNTER — OFFICE VISIT (OUTPATIENT)
Dept: CARDIOLOGY CLINIC | Age: 67
End: 2022-02-28
Payer: OTHER GOVERNMENT

## 2022-02-28 VITALS
HEART RATE: 88 BPM | WEIGHT: 255 LBS | BODY MASS INDEX: 37.77 KG/M2 | SYSTOLIC BLOOD PRESSURE: 128 MMHG | DIASTOLIC BLOOD PRESSURE: 70 MMHG | HEIGHT: 69 IN | OXYGEN SATURATION: 98 %

## 2022-02-28 DIAGNOSIS — I10 ESSENTIAL HYPERTENSION WITH GOAL BLOOD PRESSURE LESS THAN 140/90: Primary | ICD-10-CM

## 2022-02-28 DIAGNOSIS — E66.01 MORBID OBESITY, UNSPECIFIED OBESITY TYPE (HCC): ICD-10-CM

## 2022-02-28 DIAGNOSIS — E78.00 PURE HYPERCHOLESTEROLEMIA: ICD-10-CM

## 2022-02-28 DIAGNOSIS — G47.33 OSA (OBSTRUCTIVE SLEEP APNEA): ICD-10-CM

## 2022-02-28 PROCEDURE — 99214 OFFICE O/P EST MOD 30 MIN: CPT | Performed by: INTERNAL MEDICINE

## 2022-02-28 NOTE — LETTER
2/28/2022    Patient: Pilar Jackson   YOB: 1955   Date of Visit: 2/28/2022     Digna Mattson NP  [address]  Via In Basket    Dear Digna Mattson NP,      Thank you for referring Mr. Saima Leblancnt to [address] [place] [address] for evaluation. My notes for this consultation are attached. If you have questions, please do not hesitate to call me. I look forward to following your patient along with you.       Sincerely,    Abhishek Aguiar MD

## 2022-02-28 NOTE — PROGRESS NOTES
Vinod Mcgee presents today for   Chief Complaint   Patient presents with    Follow-up     3 months        Vinod Mcgee preferred language for health care discussion is english/other. Is someone accompanying this pt? no    Is the patient using any DME equipment during [address]? no    Depression Screening:  3 most recent PHQ Screens 2/28/2022   Little interest or pleasure in doing things Not at all   Feeling down, depressed, irritable, or hopeless Not at all   Total Score PHQ 2 0     Abuse Screening:  Abuse Screening Questionnaire 1/12/2022   Do you ever feel afraid of your partner? N   Are you in a relationship with someone who physically or mentally threatens you? N   Is it safe for you to go home? Y     Pt currently taking Anticoagulant therapy? ASA 81mg every day     Coordination of Care:  1. Have you been to the ER, urgent care clinic since your last visit? Hospitalized since your last visit? no    2. Have you seen or consulted any other health care providers outside of the [address] [place] [address] since your last visit? Include any pap smears or colon screening.  no

## 2022-02-28 NOTE — PROGRESS NOTES
[facility]ists    Mr. Steve Reddy is a 61 y.o. male with a history of diabetes, hypertension, hyperlipidemia and prostate cancer    Patient is here today for cardiac evaluation. He denies any prior history of MI or CHF. He denies any hospital admission or ER visits since last time. He denies any chest pain or chest tightness. He has lost almost 20 pounds. He is working on his lifestyle modification. He denies any limitation of any activities. Denies any nausea, vomiting, abdominal pain, fever, chills, sputum production. No hematuria or other bleeding complaints    Past Medical History:   Diagnosis Date    Diabetes ([facility] [address]     Hayfever     Hypertension     Obesity     Pneumonia 1985 or 1986    Prostate cancer ([facility]     [id number] CaP 3+3    Sleep apnea     CONNIE (stress urinary incontinence), male        Review of Systems:  Cardiac symptoms as noted above in HPI. All others negative. Current Outpatient Medications   Medication Sig    metFORMIN (GLUCOPHAGE) 1,000 mg tablet Take 1 Tablet by mouth two (2) times daily (with meals).  empagliflozin (Jardiance) 25 mg tablet Take 1 Tablet by mouth daily.  atorvastatin (LIPITOR) 40 mg tablet TAKE 1 TABLET DAILY    fexofenadine (Allegra) 60 mg tablet Take 3 Tablets by mouth daily.  atenoloL (TENORMIN) 25 mg tablet Take 1 Tablet by mouth daily.  amLODIPine-benazepril (LOTREL) 10-40 mg per capsule Take 1 Capsule by mouth daily.  hydroCHLOROthiazide (HYDRODIURIL) 25 mg tablet Take 1 Tablet by mouth daily.  beclomethasone dipropionate (QNASL) 80 mcg/actuation HFAA by Nasal route.  aspirin 81 mg tablet Take 81 mg by mouth.  Omega-3-DHA-EPA-Fish Oil 1,000 (120-180) mg Cap Take  by mouth. No current facility-administered medications for this visit.        Past Surgical History:   Procedure Laterality Date    BIOPSY PROSTATE  3/2/11    HX HERNIA REPAIR  12/2015    HX RADICAL PROSTATECTOMY  5/6/2011    RI APPENDECTOMY  5/1977    VASECTOMY  1990 or 1991       Allergies and Sensitivities:  Allergies   Allergen Reactions    Hay Fever And Allergy Relief Runny Nose and Cough       Family History:  Family History   Problem Relation Age of Onset    Cancer Father         lung cancer    Prostate Cancer Father     Other Father         vision impaired    Hypertension Mother     Obesity Mother     Hearing Impairment Mother     Other Mother         vision impair    Hypertension Sister        Social History:  Social History     Tobacco Use    Smoking status: Never Smoker    Smokeless tobacco: Never Used   Vaping Use    Vaping Use: Never used   Substance Use Topics    Alcohol use: Yes     Comment: occ    Drug use: No     He  reports that he has never smoked. He has never used smokeless tobacco.  He  reports current alcohol use. Physical Exam:  BP Readings from Last 3 Encounters:   02/28/22 128/70   02/11/22 126/70   11/22/21 126/70         Pulse Readings from Last 3 Encounters:   02/28/22 88   11/22/21 78   10/15/21 84          Wt Readings from Last 3 Encounters:   02/28/22 115.7 kg (255 lb)   02/11/22 120.2 kg (265 lb)   11/22/21 120.2 kg (265 lb)       Constitutional: Oriented to person, place, and time. HENT: Head: Normocephalic and atraumatic. Neck: No JVD present. Carotid bruit is not appreciated. Cardiovascular: Regular rhythm. No murmur, gallop or rubs appreciated  Lung: Breath sounds normal. No respiratory distress. No ronchi or rales appreciated  Abdominal: No tenderness. No rebound and no guarding. Musculoskeletal: There is no lower extremity edema.  No cynosis    LABS:   @  Lab Results   Component Value Date/Time    WBC 7.8 09/25/2021 09:55 AM    HGB 17.1 (H) 09/25/2021 09:55 AM    HCT 51.7 (H) 09/25/2021 09:55 AM    PLATELET 186 93/18/7544 09:55 AM    MCV 88.8 09/25/2021 09:55 AM     Lab Results   Component Value Date/Time    Sodium 136 01/14/2022 03:47 PM    Potassium 3.4 2022 03:47 PM    Chloride 99 2022 03:47 PM    CO2 25 2022 03:47 PM    Glucose 86 2022 03:47 PM    BUN 17 2022 03:47 PM    Creatinine 1.00 2022 03:47 PM     Lipids Latest Ref Rng & Units 2021   Chol, Total <200 mg/dL 89   HDL 40 - 60 mg/dL 21(L)   LDL 0 - 100 mg/dL 36.2   Trig <150 mg/dL 159(H)   Chol/HDL Ratio 0 - 5.0   4.2   Some recent data might be hidden     Lab Results   Component Value Date/Time    ALT (SGPT) 50 2022 03:47 PM     Lab Results   Component Value Date/Time    Hemoglobin A1c 6.0 (H) 2022 03:47 PM    Hemoglobin A1c (POC) 9.0 2021 04:40 PM     Lab Results   Component Value Date/Time    TSH 1.36 2021 09:55 AM     EK2021: Sinus rhythm. No Q waves. Normal [place] and QRS interval.  Likely early repolarization    NUCLEAR CARDIAC STRESS TEST 10/15/2021, 10/15/2021 10/15/2021  Interpretation Summary  · Baseline ECG: Normal sinus rhythm, non-specific ST-T wave abnormalities. · Stress test: Stress EKG normal. Low risk Duke treadmill score. Exercise stress test: EKG stress test results correlate with an intermediate risk of inducible myocardial ischemia supported by the following factors: multiple CV risk factors. Further cardiac evaluation for ischemic heart disease could be considered, especially in the setting of progressive or typical angina. · SPECT: Left ventricular function post-stress was normal. Calculated ejection fraction is 50% (normal EF value is equal to or greater than 50%). · SPECT: Myocardial perfusion imaging defect 1: There is a defect that is small in size of the inferior wall(s) that is predominantly fixed. There is normal wall motion in the defect area. The defect appears to be an artifact. · SPECT: Left ventricular perfusion is equivocal. Myocardial perfusion imaging supports a low risk stress test.    ECHO ()  Left Ventricle Left ventricle size is normal. Normal wall thickness.  Normal wall motion. Normal left ventricular systolic function with a visually estimated EF of 60 - 65%. Normal diastolic function. Left Atrium Left atrial volume index is normal (16-34 mL/m2). LA Vol Index A/L is 30 mL/m2. Right Ventricle Right ventricle is mildly dilated. Normal systolic function. Right Atrium Right atrium is mildly dilated. Aortic Valve Valve structure is normal. No transvalvular regurgitation. No stenosis. Mitral Valve Valve structure is normal. No transvalvular regurgitation. No stenosis. Tricuspid Valve Valve structure is normal. No transvalvular regurgitation. No stenosis. Unable to assess RVSP due to inadequate or insignificant tricuspid regurgitation. Pulmonic Valve The pulmonic valve visualization is suboptimal but appears to be functioning normally. Aorta Dilated aortic root; 3.8 cm. IMPRESSION & PLAN:  Mr. Indira Dodson is a 79-year-old male    Hypertension:  /70. Currently on amlodipine, benazepril, hydrochlorothiazide and atenolol. Nonpharmacologic intervention for hypertension discussed with patient in detail especially salt restriction, weight loss, aerobic exercise and dietary modification discussed during next visit again    Hyperlipidemia:  Currently on atorvastatin 40 mg daily. Total cholesterol 89 and LDL was 36. Reasonable to consider decreasing dose to 20 mg    Diabetes:  Goal hemoglobin A1c less than 7 is recommended from cardiovascular standpoint. Last hemoglobin A1c was  -->6. Now patient is on Metformin    Sleep apnea:  Using CPAP machine. Obesity:  Importance of diet and exercise was discussed with patient. Max weight was 285 and now with dietary modification is down to 255. He will continue to work on his diet and exercise program    This plan was discussed with patient who is in agreement. Thank you for allowing me to participate in patient care. Please feel free to call me if you have any question or concern. Charmayne Hai, MD  Please note:  This document has been produced using voice recognition software. Unrecognized errors in transcription may be present.

## 2022-03-19 PROBLEM — E66.01 OBESITY, MORBID (HCC): Status: ACTIVE | Noted: 2018-06-27

## 2022-04-04 ENCOUNTER — OFFICE VISIT (OUTPATIENT)
Dept: FAMILY MEDICINE CLINIC | Age: 67
End: 2022-04-04
Payer: OTHER GOVERNMENT

## 2022-04-04 ENCOUNTER — HOSPITAL ENCOUNTER (OUTPATIENT)
Dept: LAB | Age: 67
Discharge: HOME | End: 2022-04-04
Payer: OTHER GOVERNMENT

## 2022-04-04 VITALS
TEMPERATURE: 97.5 F | HEART RATE: 70 BPM | WEIGHT: 249 LBS | DIASTOLIC BLOOD PRESSURE: 74 MMHG | OXYGEN SATURATION: 97 % | BODY MASS INDEX: 36.88 KG/M2 | RESPIRATION RATE: 19 BRPM | SYSTOLIC BLOOD PRESSURE: 134 MMHG | HEIGHT: 69 IN

## 2022-04-04 DIAGNOSIS — I10 ESSENTIAL HYPERTENSION: Primary | ICD-10-CM

## 2022-04-04 DIAGNOSIS — E53.8 B12 DEFICIENCY: ICD-10-CM

## 2022-04-04 DIAGNOSIS — Z90.79 HISTORY OF RADICAL PROSTATECTOMY: ICD-10-CM

## 2022-04-04 DIAGNOSIS — E55.9 VITAMIN D DEFICIENCY: ICD-10-CM

## 2022-04-04 DIAGNOSIS — E11.9 TYPE 2 DIABETES MELLITUS WITHOUT COMPLICATION, WITHOUT LONG-TERM CURRENT USE OF INSULIN (HCC): ICD-10-CM

## 2022-04-04 DIAGNOSIS — Z00.00 WELLNESS EXAMINATION: ICD-10-CM

## 2022-04-04 DIAGNOSIS — Z85.46 HISTORY OF PROSTATE CANCER: ICD-10-CM

## 2022-04-04 DIAGNOSIS — E78.2 MIXED HYPERLIPIDEMIA: ICD-10-CM

## 2022-04-04 LAB
ALBUMIN SERPL-MCNC: 4.2 G/DL (ref 3.5–4.7)
ALBUMIN/GLOB SERPL: 1.2 {RATIO}
ALP SERPL-CCNC: 71 U/L (ref 38–126)
ALT SERPL-CCNC: 34 U/L (ref 3–72)
ANION GAP SERPL CALC-SCNC: 14 MMOL/L
AST SERPL W P-5'-P-CCNC: 26 U/L (ref 17–74)
BASOPHILS # BLD: 0.1 K/UL (ref 0–0.1)
BASOPHILS NFR BLD: 1 % (ref 0–2)
BILIRUB SERPL-MCNC: 0.9 MG/DL (ref 0.2–1)
BUN SERPL-MCNC: 19 MG/DL (ref 9–21)
BUN/CREAT SERPL: 19
CA-I BLD-MCNC: 9.8 MG/DL (ref 8.5–10.5)
CHLORIDE SERPL-SCNC: 97 MMOL/L (ref 94–111)
CO2 SERPL-SCNC: 29 MMOL/L (ref 21–33)
CREAT SERPL-MCNC: 1 MG/DL (ref 0.8–1.5)
DIFFERENTIAL METHOD BLD: ABNORMAL
EOSINOPHIL # BLD: 0.3 K/UL (ref 0–0.4)
EOSINOPHIL NFR BLD: 3 % (ref 0–5)
ERYTHROCYTE [DISTWIDTH] IN BLOOD BY AUTOMATED COUNT: 13 % (ref 11.6–14.5)
GLOBULIN SER CALC-MCNC: 3.4 G/DL
GLUCOSE SERPL-MCNC: 83 MG/DL (ref 70–110)
HCT VFR BLD AUTO: 46.9 % (ref 36–48)
HGB BLD-MCNC: 15.1 G/DL (ref 13–16)
IMM GRANULOCYTES # BLD AUTO: 0 K/UL (ref 0–0.04)
IMM GRANULOCYTES NFR BLD AUTO: 0 % (ref 0–0.5)
LYMPHOCYTES # BLD: 2.5 K/UL (ref 0.9–3.6)
LYMPHOCYTES NFR BLD: 28 % (ref 21–52)
MCH RBC QN AUTO: 29.4 PG (ref 24–34)
MCHC RBC AUTO-ENTMCNC: 32.2 G/DL (ref 31–37)
MCV RBC AUTO: 91.2 FL (ref 78–100)
MONOCYTES # BLD: 1.1 K/UL (ref 0.05–1.2)
MONOCYTES NFR BLD: 12 % (ref 3–10)
NEUTS SEG # BLD: 4.9 K/UL (ref 1.8–8)
NEUTS SEG NFR BLD: 56 % (ref 40–73)
NRBC # BLD: 0 K/UL (ref 0–0.01)
NRBC BLD-RTO: 0 PER 100 WBC
PLATELET # BLD AUTO: 251 K/UL (ref 135–420)
PMV BLD AUTO: 10.1 FL (ref 9.2–11.8)
POTASSIUM SERPL-SCNC: 3.9 MMOL/L (ref 3.2–5.1)
PROT SERPL-MCNC: 7.6 G/DL (ref 6.1–8.4)
RBC # BLD AUTO: 5.14 M/UL (ref 4.35–5.65)
SODIUM SERPL-SCNC: 140 MMOL/L (ref 135–145)
TSH SERPL DL<=0.05 MIU/L-ACNC: 3.5 UIU/ML (ref 0.35–6.2)
WBC # BLD AUTO: 8.8 K/UL (ref 4.6–13.2)

## 2022-04-04 PROCEDURE — 80061 LIPID PANEL: CPT

## 2022-04-04 PROCEDURE — 80053 COMPREHEN METABOLIC PANEL: CPT

## 2022-04-04 PROCEDURE — 84443 ASSAY THYROID STIM HORMONE: CPT

## 2022-04-04 PROCEDURE — 3044F HG A1C LEVEL LT 7.0%: CPT | Performed by: NURSE PRACTITIONER

## 2022-04-04 PROCEDURE — 86803 HEPATITIS C AB TEST: CPT

## 2022-04-04 PROCEDURE — 82607 VITAMIN B-12: CPT

## 2022-04-04 PROCEDURE — 36415 COLL VENOUS BLD VENIPUNCTURE: CPT

## 2022-04-04 PROCEDURE — 85025 COMPLETE CBC W/AUTO DIFF WBC: CPT

## 2022-04-04 PROCEDURE — 84153 ASSAY OF PSA TOTAL: CPT

## 2022-04-04 PROCEDURE — 99214 OFFICE O/P EST MOD 30 MIN: CPT | Performed by: NURSE PRACTITIONER

## 2022-04-04 PROCEDURE — 83036 HEMOGLOBIN GLYCOSYLATED A1C: CPT

## 2022-04-04 PROCEDURE — 82306 VITAMIN D 25 HYDROXY: CPT

## 2022-04-04 NOTE — PROGRESS NOTES
Shane Torres presents today for   Chief Complaint   Patient presents with    New Patient       Is someone accompanying this pt? No    Is the patient using any DME equipment during OV? NO    Depression Screening:  3 most recent PHQ Screens 4/4/2022   Little interest or pleasure in doing things Not at all   Feeling down, depressed, irritable, or hopeless More than half the days   Total Score PHQ 2 2       Learning Assessment:  Learning Assessment 4/4/2022   PRIMARY LEARNER Patient   HIGHEST LEVEL OF EDUCATION - PRIMARY LEARNER  4 YEARS OF COLLEGE   BARRIERS PRIMARY LEARNER NONE   CO-LEARNER CAREGIVER No   PRIMARY LANGUAGE ENGLISH   LEARNER PREFERENCE PRIMARY LISTENING     DEMONSTRATION   ANSWERED BY patient   RELATIONSHIP SELF     Health Maintenance reviewed and discussed and ordered per Provider. Health Maintenance Due   Topic Date Due    Hepatitis C Screening  Never done    Pneumococcal 0-64 years (1 of 2 - PPSV23) Never done    Foot Exam Q1  Never done    DTaP/Tdap/Td series (1 - Tdap) Never done    Colorectal Cancer Screening Combo  Never done    Shingrix Vaccine Age 50> (1 of 2) Never done    COVID-19 Vaccine (3 - Booster) 07/18/2021   . Coordination of Care:    1. Have you been to the ER, urgent care clinic since your last visit? Hospitalized since your last visit? NO    2. Have you seen or consulted any other health care providers outside of the [address] [place] [address] since your last visit? Include any pap smears or colon screening. No    3. For patients aged 39-70: Has the patient had a colonoscopy / FIT/ Cologuard? Yes - Care Gap present. Rooming MA/LPN to request most recent results - [facility]       If the patient is female:    4. For patients aged 41-77: Has the patient had a mammogram within the past 2 years? NA - based on age or sex      11. For patients aged 21-65: Has the patient had a pap smear?  NA - based on age or sex

## 2022-04-05 LAB
25(OH)D3 SERPL-MCNC: 41 NG/ML (ref 30–100)
CHOLEST SERPL-MCNC: 112 MG/DL
EST. AVERAGE GLUCOSE BLD GHB EST-MCNC: 123 MG/DL
HBA1C MFR BLD: 5.9 % (ref 4.2–5.6)
HCV AB SER IA-ACNC: <0.02 INDEX
HCV AB SERPL QL IA: NEGATIVE
HCV COMMENT,HCGAC: NORMAL
HDLC SERPL-MCNC: 28 MG/DL (ref 40–60)
HDLC SERPL: 4 {RATIO} (ref 0–5)
LDLC SERPL CALC-MCNC: 42 MG/DL (ref 0–100)
LIPID PROFILE,FLP: ABNORMAL
PSA SERPL-MCNC: 0 NG/ML (ref 0–4)
TRIGL SERPL-MCNC: 210 MG/DL (ref ?–150)
VIT B12 SERPL-MCNC: 231 PG/ML (ref 211–911)
VLDLC SERPL CALC-MCNC: 42 MG/DL

## 2022-04-14 NOTE — PROGRESS NOTES
Will Avitia is a 61 y.o.male presents with   Chief Complaint   Patient presents with    New Patient       30-year-old male presents today in office to establish care. He has history significant for diabetes type 2. Patient relates he has been working to improve dietary and lifestyle modifications he continues to lose weight. He goes on to report that he has a history of prostate cancer with a total prostatectomy in the past.      Subjective:           Past Medical History:   Diagnosis Date    Diabetes [facility] [address]     Hayfever     Hypertension     Obesity     Pneumonia 1985 or 1986    Prostate cancer ([facility] [address]     [id number] CaP 3+3    Sleep apnea     CONNIE (stress urinary incontinence), male      Past Surgical History:   Procedure Laterality Date    BIOPSY PROSTATE  3/2/11    HX HERNIA REPAIR  12/2015    HX RADICAL PROSTATECTOMY  5/6/2011    PA APPENDECTOMY  5/1977    VASECTOMY  1990 or 1991     Social History     Socioeconomic History    Marital status:    Tobacco Use    Smoking status: Never Smoker    Smokeless tobacco: Never Used   Vaping Use    Vaping Use: Never used   Substance and Sexual Activity    Alcohol use: Yes     Comment: occ    Drug use: No    Sexual activity: Never     Current Outpatient Medications   Medication Sig Dispense Refill    metFORMIN (GLUCOPHAGE) 1,000 mg tablet Take 1 Tablet by mouth two (2) times daily (with meals). 60 Tablet 1    empagliflozin (Jardiance) 25 mg tablet Take 1 Tablet by mouth daily. 90 Tablet 1    atorvastatin (LIPITOR) 40 mg tablet TAKE 1 TABLET DAILY 90 Tablet 1    fexofenadine (Allegra) 60 mg tablet Take 3 Tablets by mouth daily. 90 Tablet 1    atenoloL (TENORMIN) 25 mg tablet Take 1 Tablet by mouth daily. 90 Tablet 1    amLODIPine-benazepril (LOTREL) 10-40 mg per capsule Take 1 Capsule by mouth daily. 90 Capsule 1    hydroCHLOROthiazide (HYDRODIURIL) 25 mg tablet Take 1 Tablet by mouth daily.  90 Tablet 1    aspirin 81 mg tablet Take 81 mg by mouth.  Omega-3-DHA-EPA-Fish Oil 1,000 (120-180) mg Cap Take  by mouth.  beclomethasone dipropionate (QNASL) 80 mcg/actuation HFAA by Nasal route. (Patient not taking: Reported on 4/4/2022)       Allergies   Allergen Reactions    Hay Fever And Allergy Relief Runny Nose and Cough     The patient has a family history of    REVIEW OF SYSTEMS  Review of Systems   Constitutional: Negative for chills and fever. HENT: Negative for ear discharge, ear pain, hearing loss, sinus pain and sore throat. Eyes: Negative for pain. Respiratory: Negative for cough and shortness of breath. Cardiovascular: Negative for chest pain, palpitations and leg swelling. Gastrointestinal: Negative for abdominal pain, nausea and vomiting. Genitourinary: Negative for dysuria, frequency and urgency. Musculoskeletal: Negative for falls, myalgias and neck pain. Skin: Negative for rash. Neurological: Negative for dizziness, tingling, tremors and headaches. Psychiatric/Behavioral: Negative for depression, substance abuse and suicidal ideas. The patient is not nervous/anxious and does not have insomnia. Objective:     Visit Vitals  /74 (BP 1 Location: Left upper arm, BP Patient Position: Sitting, BP Cuff Size: Large adult)   Pulse 70   Temp 97.5 °F (36.4 °C) (Temporal)   Resp 19   Ht 5' 9\" (1.753 m)   Wt 249 lb (112.9 kg)   SpO2 97%   BMI 36.77 kg/m²       Current Outpatient Medications   Medication Instructions    amLODIPine-benazepril (LOTREL) 10-40 mg per capsule 1 Capsule, Oral, DAILY    aspirin 81 mg    atenoloL (TENORMIN) 25 mg, Oral, DAILY    atorvastatin (LIPITOR) 40 mg tablet TAKE 1 TABLET DAILY    beclomethasone dipropionate (QNASL) 80 mcg/actuation HFAA by Nasal route.     empagliflozin (JARDIANCE) 25 mg, Oral, DAILY    fexofenadine (ALLEGRA) 180 mg, Oral, DAILY    hydroCHLOROthiazide (HYDRODIURIL) 25 mg, Oral, DAILY    metFORMIN (GLUCOPHAGE) 1,000 mg, Oral, 2 TIMES DAILY WITH MEALS  Omega-3-DHA-EPA-Fish Oil 1,000 (120-180) mg Cap Take  by mouth. PHYSICAL EXAM  Physical Exam  Constitutional:       Appearance: He is obese. HENT:      Head: Normocephalic and atraumatic. Right Ear: Tympanic membrane, ear canal and external ear normal.      Left Ear: Tympanic membrane, ear canal and external ear normal.      Nose: Nose normal.      Mouth/Throat:      Mouth: Mucous membranes are moist.      Pharynx: Oropharynx is clear. Eyes:      General: No scleral icterus. Conjunctiva/sclera: Conjunctivae normal.      Pupils: Pupils are equal, round, and reactive to light. Cardiovascular:      Rate and Rhythm: Normal rate and regular rhythm. Pulses: Normal pulses. Heart sounds: Normal heart sounds. No murmur heard. Pulmonary:      Effort: Pulmonary effort is normal. No respiratory distress. Breath sounds: Normal breath sounds. Musculoskeletal:         General: No swelling or tenderness. Cervical back: Normal range of motion. Skin:     General: Skin is warm and dry. Capillary Refill: Capillary refill takes less than 2 seconds. Findings: No erythema. Neurological:      Mental Status: He is alert and oriented to person, place, and time. Psychiatric:         Mood and Affect: Mood normal.         Behavior: Behavior normal.         Thought Content: Thought content normal.         Judgment: Judgment normal.         Assessment/Plan:     Diagnoses and all orders for this visit:    1. Essential hypertension  -     TSH 3RD GENERATION    2. History of prostate cancer  -     PSA, DIAGNOSTIC (PROSTATE SPECIFIC AG)    3. History of radical prostatectomy  -     PSA, DIAGNOSTIC (PROSTATE SPECIFIC AG)    4. Type 2 diabetes mellitus without complication, without long-term current use of insulin (HCC)  -     CBC W/O DIFF  -     HEMOGLOBIN A1C W/O EAG  -     METABOLIC PANEL, COMPREHENSIVE    5.  Mixed hyperlipidemia  -     LIPID PANEL    6. Vitamin D deficiency  - VITAMIN D, 25 HYDROXY    7. B12 deficiency  -     VITAMIN B12    8. Wellness examination  -     HEPATITIS C AB           -Labs today any changes to current treatment contingent upon those findings          Disclaimer:    I have discussed the diagnosis with the patient and the intended plan as seen above. The patient understands our medical plan. The risks, benefits and significant side effects of all medications have been reviewed. Anticipated time course and progression of condition reviewed. All questions have been addressed. He received an after visit summary, with information reviewed, and questions answered. Where appropriate, he is instructed to call the clinic if he has not been notified either by phone or through [id number] with the results of his tests or with an appointment plan for any referrals within 1 week(s). The patient  is to call if his condition worsens or fails to improve or if significant side effects are experienced.        Martha Kaiser NP

## 2022-05-09 DIAGNOSIS — Z12.83 SKIN EXAM, SCREENING FOR CANCER: Primary | ICD-10-CM

## 2022-06-24 DIAGNOSIS — E11.65 UNCONTROLLED TYPE 2 DIABETES MELLITUS WITH HYPERGLYCEMIA (HCC): ICD-10-CM

## 2022-07-28 NOTE — TELEPHONE ENCOUNTER
Called to confirm appointment. Eucrisa Pregnancy And Lactation Text: This medication has not been assigned a Pregnancy Risk Category but animal studies failed to show danger with the topical medication. It is unknown if the medication is excreted in breast milk.

## 2022-08-30 DIAGNOSIS — I10 HTN (HYPERTENSION), MALIGNANT: ICD-10-CM

## 2022-08-30 RX ORDER — HYDROCHLOROTHIAZIDE 25 MG/1
25 TABLET ORAL DAILY
Qty: 90 TABLET | Refills: 1 | Status: SHIPPED | OUTPATIENT
Start: 2022-08-30

## 2022-08-30 RX ORDER — AMLODIPINE BESYLATE AND BENAZEPRIL HYDROCHLORIDE 10; 40 MG/1; MG/1
1 CAPSULE ORAL DAILY
Qty: 90 CAPSULE | Refills: 1 | Status: SHIPPED | OUTPATIENT
Start: 2022-08-30

## 2022-09-27 ENCOUNTER — OFFICE VISIT (OUTPATIENT)
Dept: FAMILY MEDICINE CLINIC | Age: 67
End: 2022-09-27
Payer: OTHER GOVERNMENT

## 2022-09-27 ENCOUNTER — HOSPITAL ENCOUNTER (OUTPATIENT)
Dept: GENERAL RADIOLOGY | Age: 67
Discharge: HOME | End: 2022-09-27
Attending: FAMILY MEDICINE
Payer: OTHER GOVERNMENT

## 2022-09-27 VITALS
SYSTOLIC BLOOD PRESSURE: 133 MMHG | DIASTOLIC BLOOD PRESSURE: 83 MMHG | WEIGHT: 251 LBS | RESPIRATION RATE: 20 BRPM | HEART RATE: 79 BPM | BODY MASS INDEX: 37.18 KG/M2 | HEIGHT: 69 IN | OXYGEN SATURATION: 95 %

## 2022-09-27 DIAGNOSIS — M54.50 LUMBAR PAIN: Primary | ICD-10-CM

## 2022-09-27 DIAGNOSIS — M54.50 LUMBAR PAIN: ICD-10-CM

## 2022-09-27 PROCEDURE — 99213 OFFICE O/P EST LOW 20 MIN: CPT | Performed by: FAMILY MEDICINE

## 2022-09-27 PROCEDURE — 72100 X-RAY EXAM L-S SPINE 2/3 VWS: CPT

## 2022-09-27 RX ORDER — IBUPROFEN 800 MG/1
800 TABLET ORAL
Qty: 20 TABLET | Refills: 0 | Status: SHIPPED | OUTPATIENT
Start: 2022-09-27 | End: 2022-10-07

## 2022-09-27 RX ORDER — TRAMADOL HYDROCHLORIDE 50 MG/1
50 TABLET ORAL
Qty: 21 TABLET | Refills: 0 | Status: SHIPPED | OUTPATIENT
Start: 2022-09-27 | End: 2022-10-04

## 2022-09-27 NOTE — PATIENT INSTRUCTIONS
Get electrolytes  Hydrate well  Take magnesium 250 mg twice a day. 4.   Take Motrin or tramadol as needed for pain. 5. Use heat several times a day.

## 2022-09-27 NOTE — PROGRESS NOTES
Kasia Sen presents today for back pain. Patient states that he strained his lower back on Sunday mowing. Patient stated that yesterday he couldn't move. Patient is needing a work excuse. Patient stated that he has been placing heating pads and uses patches that have provided some relief but not enough. Patient states that this is the worst that he has ever felt. Chief Complaint   Patient presents with    Back Pain       Is someone accompanying this pt? Yes, Lashone Rank- wife  Is the patient using any DME equipment during [address]? No    Depression Screening:  3 most recent PHQ Screens 9/27/2022   Little interest or pleasure in doing things Not at all   Feeling down, depressed, irritable, or hopeless Not at all   Total Score PHQ 2 0       Learning Assessment:  Learning Assessment 4/4/2022   PRIMARY LEARNER Patient   HIGHEST LEVEL OF EDUCATION - PRIMARY LEARNER  4 YEARS OF COLLEGE   BARRIERS PRIMARY LEARNER NONE   CO-LEARNER CAREGIVER No   PRIMARY LANGUAGE ENGLISH   LEARNER PREFERENCE PRIMARY LISTENING     DEMONSTRATION   ANSWERED BY patient   RELATIONSHIP SELF       Abuse Screening:  Abuse Screening Questionnaire 1/12/2022   Do you ever feel afraid of your partner? N   Are you in a relationship with someone who physically or mentally threatens you? N   Is it safe for you to go home? Y       Fall Risk  No flowsheet data found.     ADL  ADL Assessment 1/12/2022   Feeding yourself No Help Needed   Getting from bed to chair No Help Needed   Getting dressed No Help Needed   Bathing or showering No Help Needed   Walk across the room (includes cane/walker) No Help Needed   Using the telphone No Help Needed   Taking your medications No Help Needed   Preparing meals No Help Needed   Managing money (expenses/bills) No Help Needed   Moderately strenuous housework (laundry) No Help Needed   Shopping for personal items (toiletries/medicines) No Help Needed   Shopping for groceries No Help Needed   Driving No Help Needed   Climbing a flight of stairs No Help Needed   Getting to places beyond walking distances No Help Needed       Health Maintenance reviewed and discussed and ordered per Provider. Health Maintenance Due   Topic Date Due    Pneumococcal 0-64 years (1 - PCV) Never done    Foot Exam Q1  Never done    Eye Exam Retinal or Dilated  Never done    DTaP/Tdap/Td series (1 - Tdap) Never done    Colorectal Cancer Screening Combo  Never done    Shingrix Vaccine Age 50> (1 of 2) Never done    COVID-19 Vaccine (3 - Booster) 07/18/2021    Flu Vaccine (1) 08/01/2022    MICROALBUMIN Q1  09/21/2022   . Coordination of Care:  1. Have you been to the ER, urgent care clinic since your last visit? Hospitalized since your last visit? No    2. Have you seen or consulted any other health care providers outside of the [address] [place] [address] since your last visit? Include any pap smears or colon screening.  No
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
## 2022-10-03 ENCOUNTER — OFFICE VISIT (OUTPATIENT)
Dept: FAMILY MEDICINE CLINIC | Age: 67
End: 2022-10-03
Payer: OTHER GOVERNMENT

## 2022-10-03 VITALS
RESPIRATION RATE: 16 BRPM | TEMPERATURE: 97.7 F | BODY MASS INDEX: 37.62 KG/M2 | SYSTOLIC BLOOD PRESSURE: 133 MMHG | DIASTOLIC BLOOD PRESSURE: 84 MMHG | HEART RATE: 66 BPM | HEIGHT: 69 IN | OXYGEN SATURATION: 96 % | WEIGHT: 254 LBS

## 2022-10-03 DIAGNOSIS — S39.012D STRAIN OF LUMBAR REGION, SUBSEQUENT ENCOUNTER: ICD-10-CM

## 2022-10-03 DIAGNOSIS — M62.838 MUSCLE SPASM: Primary | ICD-10-CM

## 2022-10-03 PROCEDURE — 99212 OFFICE O/P EST SF 10 MIN: CPT | Performed by: FAMILY MEDICINE

## 2022-10-03 NOTE — PROGRESS NOTES
David Blackwood is a 61 y.o. male who presents to the office today for the following:  Chief Complaint   Patient presents with    Follow-up       Allergies   Allergen Reactions    Hay Fever And Allergy Relief Runny Nose and Cough       Current Outpatient Medications   Medication Sig    traMADoL (ULTRAM) 50 mg tablet Take 1 Tablet by mouth every eight (8) hours as needed for Pain for up to 7 days. Max Daily Amount: 150 mg.    ibuprofen (MOTRIN) 800 mg tablet Take 1 Tablet by mouth every eight (8) hours as needed for Pain for up to 10 days. amLODIPine-benazepril (LOTREL) 10-40 mg per capsule Take 1 Capsule by mouth daily. hydroCHLOROthiazide (HYDRODIURIL) 25 mg tablet Take 1 Tablet by mouth daily. empagliflozin (Jardiance) 25 mg tablet Take 1 Tablet by mouth daily. metFORMIN (GLUCOPHAGE) 1,000 mg tablet Take 1 Tablet by mouth two (2) times daily (with meals). atorvastatin (LIPITOR) 40 mg tablet TAKE 1 TABLET DAILY    fexofenadine (Allegra) 60 mg tablet Take 3 Tablets by mouth daily. aspirin 81 mg tablet Take 81 mg by mouth. Omega-3-DHA-EPA-Fish Oil 1,000 (120-180) mg Cap Take  by mouth. No current facility-administered medications for this visit.        Past Medical History:   Diagnosis Date    Diabetes ([facility] [address]     Hayfever     Hypertension     Obesity     Pneumonia 1985 or 1986    Prostate cancer ([facility] [address]     [id number] CaP 3+3    Sleep apnea     CONNIE (stress urinary incontinence), male        Past Surgical History:   Procedure Laterality Date    BIOPSY PROSTATE  3/2/11    HX HERNIA REPAIR  12/2015    HX RADICAL PROSTATECTOMY  5/6/2011    [place] APPENDECTOMY  5/1977    VASECTOMY  1990 or 1991       Social History     Socioeconomic History    Marital status:      Spouse name: Not on file    Number of children: Not on file    Years of education: Not on file    Highest education level: Not on file   Occupational History    Not on file   Tobacco Use    Smoking status: Never    Smokeless tobacco: Never   Vaping Use    Vaping Use: Never used   Substance and Sexual Activity    Alcohol use: Yes     Comment: occ    Drug use: No    Sexual activity: Never   Other Topics Concern    Not on file   Social History Narrative    Not on file     Social Determinants of Health     Financial Resource Strain: Not on file   Food Insecurity: Not on file   Transportation Needs: Not on file   Physical Activity: Not on file   Stress: Not on file   Social Connections: Not on file   Intimate Partner Violence: Not on file   Housing Stability: Not on file     or  Social History     Tobacco Use   Smoking Status Never   Smokeless Tobacco Never       Family History   Problem Relation Age of Onset    Cancer Father         lung cancer    Prostate Cancer Father     Other Father         vision impaired    Hypertension Mother     Obesity Mother     Hearing Impairment Mother     Other Mother         vision impair    Hypertension Sister          HPI:  Patient is here for follow-up. He sustained severe back pain last week after mowing the lawn. And then he was not able to move much and did not get much sleep. After seen last week he was taking the Motrin and the tramadol every 8 hours which did help him get some sleep and he got plenty of the rest on that Tuesday. He has been using the heat as well and notes that compared to a week ago he is significantly improved. He denies any numbness or tingling in his lower extremities no problem with his legs giving way. He requested to see if he can continue getting an MRI but discussed that since he no longer has any symptoms there is no need at this point to do so. He already has a note for work to return tomorrow which she plans to do. ROS:  Review of Systems   Constitutional: Negative. Musculoskeletal:  Positive for back pain. All other systems reviewed and are negative.     Physical Exam:  Visit Vitals  /84 (BP 1 Location: Right arm, BP Patient Position: Sitting, BP Cuff Size: Large adult long)   Pulse 66   Temp 97.7 °F (36.5 °C) (Temporal)   Resp 16   Ht 5' 9\" (1.753 m)   Wt 254 lb (115.2 kg)   SpO2 96%   BMI 37.51 kg/m²     Physical Exam  Vitals reviewed. Constitutional:       Appearance: He is obese. Musculoskeletal:         General: Normal range of motion. Neurological:      Mental Status: He is alert. Assessment/Plan:    1. Muscle spasm/lumbar strain  Significant improvement compared to last week. He no longer has to take ibuprofen/tramadol which has helped him. He plans to return to work tomorrow. Already has a note to return to work tomorrow. Discussed importance of preventing severe muscle spasm again in the future by maintaining hydration and stretching prior to intense activity. Discussed that at this time there is no need to order MRI as his symptoms have significantly improved and he has no numbness or tingling in his lower extremities.             Maria Esther To MD

## 2022-10-03 NOTE — PROGRESS NOTES
Iqra Gardner presents today for   Chief Complaint   Patient presents with    Follow-up       Is someone accompanying this pt? No    Is the patient using any DME equipment during OV? No    Depression Screening:  3 most recent PHQ Screens 9/27/2022   Little interest or pleasure in doing things Not at all   Feeling down, depressed, irritable, or hopeless Not at all   Total Score PHQ 2 0       Learning Assessment:  Learning Assessment 4/4/2022   PRIMARY LEARNER Patient   HIGHEST LEVEL OF EDUCATION - PRIMARY LEARNER  4 YEARS OF COLLEGE   BARRIERS PRIMARY LEARNER NONE   CO-LEARNER CAREGIVER No   PRIMARY LANGUAGE ENGLISH   LEARNER PREFERENCE PRIMARY LISTENING     DEMONSTRATION   ANSWERED BY patient   RELATIONSHIP SELF       Abuse Screening:  Abuse Screening Questionnaire 1/12/2022   Do you ever feel afraid of your partner? N   Are you in a relationship with someone who physically or mentally threatens you? N   Is it safe for you to go home? Y       Fall Risk  No flowsheet data found. OPIOID RISK TOOL  No flowsheet data found. Coordination of Care:  1. Have you been to the ER, urgent care clinic since your last visit? No  Hospitalized since your last visit? No    2. Have you seen or consulted any other health care providers outside of the [address] [place] [address] since your last visit? No Include any pap smears or colon screening. N/A based on sex/age.

## 2022-10-20 ENCOUNTER — OFFICE VISIT (OUTPATIENT)
Dept: FAMILY MEDICINE CLINIC | Age: 67
End: 2022-10-20
Payer: OTHER GOVERNMENT

## 2022-10-20 VITALS
RESPIRATION RATE: 18 BRPM | OXYGEN SATURATION: 95 % | WEIGHT: 251.8 LBS | HEIGHT: 69 IN | DIASTOLIC BLOOD PRESSURE: 84 MMHG | HEART RATE: 71 BPM | TEMPERATURE: 97 F | BODY MASS INDEX: 37.29 KG/M2 | SYSTOLIC BLOOD PRESSURE: 122 MMHG

## 2022-10-20 DIAGNOSIS — I10 ESSENTIAL HYPERTENSION: ICD-10-CM

## 2022-10-20 DIAGNOSIS — E11.9 TYPE 2 DIABETES MELLITUS WITHOUT COMPLICATION, WITHOUT LONG-TERM CURRENT USE OF INSULIN (HCC): Primary | ICD-10-CM

## 2022-10-20 PROCEDURE — 3044F HG A1C LEVEL LT 7.0%: CPT | Performed by: NURSE PRACTITIONER

## 2022-10-20 PROCEDURE — 3074F SYST BP LT 130 MM HG: CPT | Performed by: NURSE PRACTITIONER

## 2022-10-20 PROCEDURE — 99213 OFFICE O/P EST LOW 20 MIN: CPT | Performed by: NURSE PRACTITIONER

## 2022-10-20 PROCEDURE — 3078F DIAST BP <80 MM HG: CPT | Performed by: NURSE PRACTITIONER

## 2022-10-20 NOTE — PROGRESS NOTES
Lizeth Crum presents today for   Chief Complaint   Patient presents with    Follow-up       Is someone accompanying this pt? NO    Is the patient using any DME equipment during OV? No    Depression Screening:  3 most recent PHQ Screens 10/20/2022   Little interest or pleasure in doing things Not at all   Feeling down, depressed, irritable, or hopeless Not at all   Total Score PHQ 2 0       Learning Assessment:  Learning Assessment 4/4/2022   PRIMARY LEARNER Patient   HIGHEST LEVEL OF EDUCATION - PRIMARY LEARNER  4 YEARS OF COLLEGE   BARRIERS PRIMARY LEARNER NONE   CO-LEARNER CAREGIVER No   PRIMARY LANGUAGE ENGLISH   LEARNER PREFERENCE PRIMARY LISTENING     DEMONSTRATION   ANSWERED BY patient   RELATIONSHIP SELF       Fall Risk  No flowsheet data found. Health Maintenance reviewed and discussed and ordered per Provider. Health Maintenance Due   Topic Date Due    Pneumococcal 0-64 years (1 - PCV) Never done    Foot Exam Q1  Never done    Eye Exam Retinal or Dilated  Never done    DTaP/Tdap/Td series (1 - Tdap) Never done    Colorectal Cancer Screening Combo  Never done    Shingrix Vaccine Age 50> (1 of 2) Never done    COVID-19 Vaccine (3 - Booster) 07/18/2021    Flu Vaccine (1) 08/01/2022    MICROALBUMIN Q1  09/21/2022   . Coordination of Care:    1. Have you been to the ER, urgent care clinic since your last visit? Hospitalized since your last visit? No    2. Have you seen or consulted any other health care providers outside of the [address] [place] [address] since your last visit? Include any pap smears or colon screening. NO    3. For patients aged 39-70: Has the patient had a colonoscopy / FIT/ Cologuard? Yes - no Care Gap present      If the patient is female:    4. For patients aged 41-77: Has the patient had a mammogram within the past 2 years? NA - based on age or sex      11. For patients aged 21-65: Has the patient had a pap smear?  NA - based on age or sex

## 2022-10-27 NOTE — PROGRESS NOTES
David Blackwood is a 61 y.o.male presents with   Chief Complaint   Patient presents with    Follow-up     Pt presents today for six month fu. Subjective:           Past Medical History:   Diagnosis Date    [facility]     Hayfever     Hypertension     Obesity     Pneumonia 1985 or 1986    Prostate cancer ([facility] [address]     [id number] CaP 3+3    Sleep apnea     CONNIE (stress urinary incontinence), male      Past Surgical History:   Procedure Laterality Date    BIOPSY PROSTATE  03/02/2011    HX COLONOSCOPY  12/2019    HX HERNIA REPAIR  12/01/2015    HX RADICAL PROSTATECTOMY  05/06/2011    [place] APPENDECTOMY  05/01/1977    VASECTOMY  1990 or 1991     Social History     Socioeconomic History    Marital status:    Tobacco Use    Smoking status: Never    Smokeless tobacco: Never   Vaping Use    Vaping Use: Never used   Substance and Sexual Activity    Alcohol use: Yes     Comment: occ    Drug use: No    Sexual activity: Never     Current Outpatient Medications   Medication Sig Dispense Refill    amLODIPine-benazepril (LOTREL) 10-40 mg per capsule Take 1 Capsule by mouth daily. 90 Capsule 1    hydroCHLOROthiazide (HYDRODIURIL) 25 mg tablet Take 1 Tablet by mouth daily. 90 Tablet 1    empagliflozin (Jardiance) 25 mg tablet Take 1 Tablet by mouth daily. 90 Tablet 1    metFORMIN (GLUCOPHAGE) 1,000 mg tablet Take 1 Tablet by mouth two (2) times daily (with meals). 60 Tablet 1    atorvastatin (LIPITOR) 40 mg tablet TAKE 1 TABLET DAILY 90 Tablet 1    fexofenadine (Allegra) 60 mg tablet Take 3 Tablets by mouth daily. 90 Tablet 1    aspirin 81 mg tablet Take 81 mg by mouth.  Omega-3-DHA-EPA-Fish Oil 1,000 (120-180) mg Cap Take  by mouth. Allergies   Allergen Reactions    Hay Fever And Allergy Relief Runny Nose and Cough     The patient has a family history of    REVIEW OF SYSTEMS  Review of Systems   Respiratory:  Negative for shortness of breath.     Cardiovascular:  Negative for chest pain and palpitations. Neurological:  Negative for dizziness and headaches. Objective:     Visit Vitals  /84 (BP 1 Location: Right upper arm, BP Patient Position: Sitting, BP Cuff Size: Large adult)   Pulse 71   Temp 97 °F (36.1 °C) (Temporal)   Resp 18   Ht 5' 9\" (1.753 m)   Wt 251 lb 12.8 oz (114.2 kg)   SpO2 95%   BMI 37.18 kg/m²       Current Outpatient Medications   Medication Instructions    amLODIPine-benazepril (LOTREL) 10-40 mg per capsule 1 Capsule, Oral, DAILY    aspirin 81 mg    atorvastatin (LIPITOR) 40 mg tablet TAKE 1 TABLET DAILY    empagliflozin (JARDIANCE) 25 mg, Oral, DAILY    fexofenadine (ALLEGRA) 180 mg, Oral, DAILY    hydroCHLOROthiazide (HYDRODIURIL) 25 mg, Oral, DAILY    metFORMIN (GLUCOPHAGE) 1,000 mg, Oral, 2 TIMES DAILY WITH MEALS    Morgan-3-DHA-EPA-Fish Oil 1,000 (120-180) mg Cap Take  by mouth. PHYSICAL EXAM  Physical Exam  Cardiovascular:      Heart sounds: Normal heart sounds. Pulmonary:      Breath sounds: Normal breath sounds. Musculoskeletal:      Right lower leg: No edema. Left lower leg: No edema. Skin:     General: Skin is warm and dry. Neurological:      Mental Status: He is alert. Assessment/Plan:     Diagnoses and all orders for this visit:    1. Type 2 diabetes mellitus without complication, without long-term current use of insulin ([facility])  A1c 5.9, well controlled    2. Essential hypertension  The current medical regimen is effective;  continue present plan and medications. Follow-up and Dispositions    Return in about 6 months (around 4/20/2023). Disclaimer:    I have discussed the diagnosis with the patient and the intended plan as seen above. The patient understands our medical plan. The risks, benefits and significant side effects of all medications have been reviewed. Anticipated time course and progression of condition reviewed. All questions have been addressed.   He received an after visit summary, with information reviewed, and questions answered. Where appropriate, he is instructed to call the clinic if he has not been notified either by phone or through [address] with the results of his tests or with an appointment plan for any referrals within 1 week(s). The patient  is to call if his condition worsens or fails to improve or if significant side effects are experienced.        Aden Squires, NP

## 2022-12-20 DIAGNOSIS — E11.65 UNCONTROLLED TYPE 2 DIABETES MELLITUS WITH HYPERGLYCEMIA (HCC): ICD-10-CM

## 2022-12-20 RX ORDER — EMPAGLIFLOZIN 25 MG/1
TABLET, FILM COATED ORAL
Qty: 90 TABLET | Refills: 3 | Status: SHIPPED | OUTPATIENT
Start: 2022-12-20

## 2023-01-04 ENCOUNTER — TELEPHONE (OUTPATIENT)
Dept: FAMILY MEDICINE CLINIC | Age: 68
End: 2023-01-04

## 2023-01-04 NOTE — TELEPHONE ENCOUNTER
Patient aware to monitor symptoms, utilize otc medications, increase hydration, and rest while the virus runs it's course. Advised patient that if he develops any SOB or chest pain, then he needs to go to the ER. Patient verbalized understanding.

## 2023-01-04 NOTE — TELEPHONE ENCOUNTER
----- Message from Aleksander sent at 1/4/2023  8:12 AM EST -----  Subject: Message to Provider    QUESTIONS  Information for Provider? Patient would like some direction as far as next   steps he tested positive for Covid x 2 per home test 1/4/23 runny nose   cough congestion.   ---------------------------------------------------------------------------  --------------  Aime MONTOYA  [phone]; OK to leave message on voicemail  ---------------------------------------------------------------------------  --------------  SCRIPT ANSWERS  Relationship to Patient?  Self

## 2023-01-24 DIAGNOSIS — E78.2 MIXED HYPERLIPIDEMIA: ICD-10-CM

## 2023-01-24 RX ORDER — ATORVASTATIN CALCIUM 40 MG/1
TABLET, FILM COATED ORAL
Qty: 90 TABLET | Refills: 1 | Status: SHIPPED | OUTPATIENT
Start: 2023-01-24

## 2023-01-24 RX ORDER — ATORVASTATIN CALCIUM 40 MG/1
TABLET, FILM COATED ORAL
Qty: 90 TABLET | Refills: 1 | Status: SHIPPED | OUTPATIENT
Start: 2023-01-24 | End: 2023-01-24 | Stop reason: SDUPTHER

## 2023-02-20 RX ORDER — HYDROCHLOROTHIAZIDE 25 MG/1
25 TABLET ORAL DAILY
COMMUNITY
Start: 2022-08-30

## 2023-02-20 RX ORDER — ATORVASTATIN CALCIUM 40 MG/1
TABLET, FILM COATED ORAL
COMMUNITY
Start: 2022-01-12

## 2023-02-20 RX ORDER — FEXOFENADINE HYDROCHLORIDE 60 MG/1
180 TABLET, FILM COATED ORAL DAILY
COMMUNITY
Start: 2022-01-12

## 2023-02-20 RX ORDER — AMLODIPINE BESYLATE AND BENAZEPRIL HYDROCHLORIDE 10; 40 MG/1; MG/1
1 CAPSULE ORAL DAILY
COMMUNITY
Start: 2022-08-30

## 2023-02-20 RX ORDER — ASPIRIN 81 MG/1
81 TABLET ORAL
COMMUNITY

## 2023-02-27 ENCOUNTER — OFFICE VISIT (OUTPATIENT)
Age: 68
End: 2023-02-27
Payer: MEDICARE

## 2023-02-27 VITALS
DIASTOLIC BLOOD PRESSURE: 80 MMHG | BODY MASS INDEX: 38.25 KG/M2 | SYSTOLIC BLOOD PRESSURE: 120 MMHG | WEIGHT: 259 LBS | HEART RATE: 78 BPM | OXYGEN SATURATION: 94 %

## 2023-02-27 DIAGNOSIS — I10 HYPERTENSION, UNSPECIFIED TYPE: Primary | ICD-10-CM

## 2023-02-27 PROCEDURE — G8484 FLU IMMUNIZE NO ADMIN: HCPCS | Performed by: INTERNAL MEDICINE

## 2023-02-27 PROCEDURE — G8417 CALC BMI ABV UP PARAM F/U: HCPCS | Performed by: INTERNAL MEDICINE

## 2023-02-27 PROCEDURE — G8428 CUR MEDS NOT DOCUMENT: HCPCS | Performed by: INTERNAL MEDICINE

## 2023-02-27 PROCEDURE — 93000 ELECTROCARDIOGRAM COMPLETE: CPT | Performed by: INTERNAL MEDICINE

## 2023-02-27 PROCEDURE — 3074F SYST BP LT 130 MM HG: CPT | Performed by: INTERNAL MEDICINE

## 2023-02-27 PROCEDURE — 3079F DIAST BP 80-89 MM HG: CPT | Performed by: INTERNAL MEDICINE

## 2023-02-27 PROCEDURE — 1123F ACP DISCUSS/DSCN MKR DOCD: CPT | Performed by: INTERNAL MEDICINE

## 2023-02-27 PROCEDURE — 99213 OFFICE O/P EST LOW 20 MIN: CPT | Performed by: INTERNAL MEDICINE

## 2023-02-27 PROCEDURE — 4004F PT TOBACCO SCREEN RCVD TLK: CPT | Performed by: INTERNAL MEDICINE

## 2023-02-27 PROCEDURE — 3017F COLORECTAL CA SCREEN DOC REV: CPT | Performed by: INTERNAL MEDICINE

## 2023-02-27 NOTE — PROGRESS NOTES
Ruba Garrido presents today for   Chief Complaint   Patient presents with    Follow-up     1 year fu       Ruba Bautistaan preferred language for health care discussion is english/other. Is someone accompanying this pt? no    Is the patient using any DME equipment during OV? no    Depression Screening:  Depression: Not at risk    PHQ-2 Score: 0         Learning Assessment:  No question data found. Abuse Screening:  No flowsheet data found. Fall Risk  No flowsheet data found. Pt currently taking Anticoagulant therapy? Asa 81mg    Coordination of Care:  1. Have you been to the ER, urgent care clinic since your last visit? Hospitalized since your last visit? no    2. Have you seen or consulted any other health care providers outside of the [address] [place] [address] since your last visit? Include any pap smears or colon screening.  no

## 2023-02-27 NOTE — PROGRESS NOTES
[facility]    Amena Erickson is 74 y.o. male with a history of diabetes, hypertension, hyperlipidemia and prostate cancer      Patient is here today for cardiac evaluation. He denies any prior history of MI or CHF. He denies any hospital admission or ER visits since last time. He denies any chest pain or chest tightness. Is any palpitation, presyncope or syncope. Denies any lower extremity swelling. No shortness of breath. Able to perform active daily living without any limitation. Denies any nausea, vomiting, abdominal pain, fever, chills, sputum production. No hematuria or other bleeding complaints     Past Medical History:   Diagnosis Date    Diabetes ([facility] [address]     Hayfever     Hypertension     Obesity     Pneumonia 1985 or 1986    Prostate cancer ([facility] [address]     [id number] CaP 3+3    Sleep apnea     DAKOTAH (stress urinary incontinence), male        Review of Systems:  Cardiac symptoms as noted above in HPI. All others negative. Denies fatigue, malaise, skin rash, blurring vision, photophobia, neck pain, hemoptysis, chronic cough, nausea, vomiting, hematuria, burning micturition, BRBPR, chronic headaches. Current Outpatient Medications   Medication Sig    amLODIPine-benazepril (LOTREL) 10-40 MG per capsule Take 1 capsule by mouth daily    aspirin 81 MG EC tablet Take 81 mg by mouth    Omega-3 Fatty Acids (FISH OIL PO) Take by mouth    atorvastatin (LIPITOR) 40 MG tablet TAKE 1 TABLET DAILY    empagliflozin (JARDIANCE) 25 MG tablet TAKE 1 TABLET DAILY    fexofenadine (ALLEGRA) 60 MG tablet Take 180 mg by mouth daily    hydroCHLOROthiazide (HYDRODIURIL) 25 MG tablet Take 25 mg by mouth daily    metFORMIN (GLUCOPHAGE) 1000 MG tablet Take 1 tablet by mouth 2 times daily (with meals)     No current facility-administered medications for this visit.        Past Surgical History:   Procedure Laterality Date    BIOPSY PROSTATE  03/02/2011    COLONOSCOPY 12/2019    HERNIA REPAIR  12/01/2015    [place] APPENDECTOMY  05/01/1977    PROSTATECTOMY  05/06/2011    VASECTOMY  1990 or 1991       Allergies and Sensitivities:  Not on File    Family History:  Family History   Problem Relation Age of Onset    Other Father         vision impaired    Hypertension Mother     Cancer Father         lung cancer    Other Mother         vision impair    Hearing Impairment Mother     Obesity Mother     Prostate Cancer Father     Hypertension Sister        Social History:  Social History     Tobacco Use    Smoking status: Never    Smokeless tobacco: Never   Substance Use Topics    Alcohol use: Yes    Drug use: No     He  reports that he has never smoked. He has never used smokeless tobacco.  He  reports current alcohol use. Physical Exam:  BP Readings from Last 3 Encounters:   02/27/23 120/80   10/20/22 122/84   10/03/22 133/84         Pulse Readings from Last 3 Encounters:   02/27/23 78   10/20/22 71   10/03/22 66          Wt Readings from Last 3 Encounters:   02/27/23 259 lb (117.5 kg)   10/20/22 251 lb 12.8 oz (114.2 kg)   10/03/22 254 lb (115.2 kg)       Constitutional: Oriented to person, place, and time. HENT: Head: Normocephalic and atraumatic. Neck: No JVD present. Cardiovascular: Regular rhythm. No murmur, gallop or rubs appreciated  Lung: Breath sounds normal. No respiratory distress. No ronchi or rales appreciated  Abdominal: No tenderness. No rebound and no guarding. Musculoskeletal: There is no lower extremity edema.  No cynosis      LABS:   @  Lab Results   Component Value Date/Time    WBC 8.8 04/04/2022 05:34 PM    HGB 15.1 04/04/2022 05:34 PM    HCT 46.9 04/04/2022 05:34 PM     04/04/2022 05:34 PM    MCV 91.2 04/04/2022 05:34 PM     Lab Results   Component Value Date/Time     04/04/2022 05:34 PM    K 3.9 04/04/2022 05:34 PM    CL 97 04/04/2022 05:34 PM    CO2 29 04/04/2022 05:34 PM    BUN 19 04/04/2022 05:34 PM    CREATININE 1.00 04/04/2022 05:34 PM GLUCOSE 83 2022 05:34 PM    CALCIUM 9.8 2022 05:34 PM       Lipids Latest Ref Rng & Units 2022   Chol <200 mg/dL 112 - 89   HDL 40 - 60 mg/dL 28(L) - 21(L)   LDL Calc 0 - 100 mg/dL 42 - 36.2   VLDL Calc mg/dL 42 - 31.8   Trig <150 mg/dL 210(H) - 159(H)   Ratio 0 - 5.0   4.0 - 4.2   ALT 3 - 72 U/L 34 50 54   AST 17 - 74 U/L 26 34 38     Lab Results   Component Value Date/Time    ALT 34 2022 05:34 PM     Hemoglobin A1C   Date Value Ref Range Status   2022 5.9 (H) 4.2 - 5.6 % Final     Comment:     (NOTE)  HbA1C Interpretive Ranges  <5.7              Normal  5.7 - 6.4         Consider Prediabetes  >6.5              Consider Diabetes       Lab Results   Component Value Date    TSH 3.50 2022      EK/2021: Sinus rhythm. No Q waves. Normal AL and QRS interval.  Likely early repolarization      NUCLEAR CARDIAC STRESS TEST 10/15/2021, 10/15/2021 10/15/2021   Interpretation Summary   · Baseline ECG: Normal sinus rhythm, non-specific ST-T wave abnormalities. · Stress test: Stress EKG normal. Low risk Duke treadmill score. Exercise stress test: EKG stress test results correlate with an intermediate risk of inducible myocardial ischemia supported by the following factors: multiple CV risk factors. Further  cardiac evaluation for ischemic heart disease could be considered, especially in the setting of progressive or typical angina. · SPECT: Left ventricular function post-stress was normal. Calculated ejection fraction is 50% (normal EF value is equal to or greater than 50%). · SPECT: Myocardial perfusion imaging defect 1: There is a defect that is small in size of the inferior wall(s) that is predominantly fixed. There is normal wall motion in the defect area. The defect appears to be an artifact.    · SPECT: Left ventricular perfusion is equivocal. Myocardial perfusion imaging supports a low risk stress test.      ECHO ()        Left Ventricle  Left ventricle size is normal. Normal wall thickness. Normal wall motion. Normal left ventricular systolic function with a visually estimated EF  of 60 - 65%. Normal diastolic function. Left Atrium  Left atrial volume index is normal (16-34 mL/m2). LA Vol Index A/L is 30 mL/m2. Right Ventricle  Right ventricle is mildly dilated. Normal systolic function. Right Atrium  Right atrium is mildly dilated. Aortic Valve  Valve structure is normal. No transvalvular regurgitation. No stenosis. Mitral Valve  Valve structure is normal. No transvalvular regurgitation. No stenosis. Tricuspid Valve  Valve structure is normal. No transvalvular regurgitation. No stenosis. Unable to assess RVSP due to inadequate or insignificant tricuspid regurgitation. Pulmonic Valve  The pulmonic valve visualization is suboptimal but appears to be functioning normally. Aorta  Dilated aortic root; 3.8 cm. IMPRESSION & PLAN:   Mr. Rajeev Carrera is a 74 y.o. male      Hypertension: /80. Continue current antihypertensives. He is on amlodipine, hydrochlorothiazide. Hyperlipidemia:  Currently on atorvastatin 40 mg daily. Last LDL 42. Diabetes:  Goal hemoglobin A1c less than 7 is recommended from cardiovascular standpoint. Last hemoglobin A1c was  5.9. Now patient is on Metformin      Sleep apnea:  Using CPAP machine. Obesity:   Importance of diet and exercise was discussed with patient. Max weight was 285 and now with dietary modification is down to 242    He has gained some weight back. He is going to work on his dietary plan again. This plan was discussed with patient who is in agreement. Thank you for allowing me to participate in patient care. Please feel free to call me if you have any question or concern. Rossy Salinas MD  Please note: This document has been produced using voice recognition software. Unrecognized errors in transcription may be present.

## 2023-03-01 ENCOUNTER — TRANSCRIBE ORDER (OUTPATIENT)
Dept: SCHEDULING | Age: 68
End: 2023-03-01

## 2023-03-11 ENCOUNTER — HOSPITAL ENCOUNTER (OUTPATIENT)
Age: 68
Discharge: HOME | End: 2023-03-14
Payer: MEDICARE

## 2023-03-11 DIAGNOSIS — Z01.812 PRE-OPERATIVE LABORATORY EXAMINATION: ICD-10-CM

## 2023-03-11 LAB
BUN SERPL-MCNC: 13 MG/DL (ref 7–18)
CREAT SERPL-MCNC: 0.98 MG/DL (ref 0.6–1.3)

## 2023-03-11 PROCEDURE — 82565 ASSAY OF CREATININE: CPT

## 2023-03-11 PROCEDURE — 84520 ASSAY OF UREA NITROGEN: CPT

## 2023-03-11 PROCEDURE — 36415 COLL VENOUS BLD VENIPUNCTURE: CPT

## 2023-03-13 RX ORDER — HYDROCHLOROTHIAZIDE 25 MG/1
TABLET ORAL
Qty: 90 TABLET | Refills: 2 | Status: SHIPPED | OUTPATIENT
Start: 2023-03-13

## 2023-03-13 RX ORDER — AMLODIPINE BESYLATE AND BENAZEPRIL HYDROCHLORIDE 10; 40 MG/1; MG/1
1 CAPSULE ORAL DAILY
Qty: 90 CAPSULE | Refills: 1 | Status: SHIPPED | OUTPATIENT
Start: 2023-03-13

## 2023-03-20 ENCOUNTER — HOSPITAL ENCOUNTER (OUTPATIENT)
Age: 68
Discharge: HOME | End: 2023-03-23
Payer: MEDICARE

## 2023-03-20 DIAGNOSIS — H90.A22 SENSORINEURAL HEARING LOSS, UNILATERAL, LEFT EAR, WITH RESTRICTED HEARING ON THE CONTRALATERAL SIDE: ICD-10-CM

## 2023-03-20 PROCEDURE — 6360000004 HC RX CONTRAST MEDICATION: Performed by: PHYSICIAN ASSISTANT

## 2023-03-20 PROCEDURE — 70553 MRI BRAIN STEM W/O & W/DYE: CPT

## 2023-03-20 PROCEDURE — A9579 GAD-BASE MR CONTRAST NOS,1ML: HCPCS | Performed by: PHYSICIAN ASSISTANT

## 2023-03-24 RX ADMIN — GADOTERIDOL 20 ML: 279.3 INJECTION, SOLUTION INTRAVENOUS at 09:59

## 2023-05-01 ENCOUNTER — HOSPITAL ENCOUNTER (OUTPATIENT)
Age: 68
Discharge: HOME | End: 2023-05-04
Payer: MEDICARE

## 2023-05-01 DIAGNOSIS — I10 ESSENTIAL (PRIMARY) HYPERTENSION: ICD-10-CM

## 2023-05-01 DIAGNOSIS — E53.8 DEFICIENCY OF OTHER SPECIFIED B GROUP VITAMINS: ICD-10-CM

## 2023-05-01 DIAGNOSIS — E78.2 MIXED HYPERLIPIDEMIA: ICD-10-CM

## 2023-05-01 DIAGNOSIS — Z12.5 ENCOUNTER FOR SCREENING FOR MALIGNANT NEOPLASM OF PROSTATE: ICD-10-CM

## 2023-05-01 DIAGNOSIS — E55.9 VITAMIN D DEFICIENCY, UNSPECIFIED: ICD-10-CM

## 2023-05-01 DIAGNOSIS — E11.9 TYPE 2 DIABETES MELLITUS WITHOUT COMPLICATION, UNSPECIFIED WHETHER LONG TERM INSULIN USE (HCC): ICD-10-CM

## 2023-05-01 DIAGNOSIS — Z11.4 SCREENING FOR HIV WITHOUT PRESENCE OF RISK FACTORS: Primary | ICD-10-CM

## 2023-05-01 DIAGNOSIS — Z11.4 SCREENING FOR HIV WITHOUT PRESENCE OF RISK FACTORS: ICD-10-CM

## 2023-05-01 LAB
ALBUMIN SERPL-MCNC: 4.1 G/DL (ref 3.4–5)
ALBUMIN/GLOB SERPL: 1 (ref 0.8–1.7)
ALP SERPL-CCNC: 79 U/L (ref 45–117)
ALT SERPL-CCNC: 78 U/L (ref 16–61)
ANION GAP SERPL CALC-SCNC: 5 MMOL/L (ref 3–18)
AST SERPL W P-5'-P-CCNC: 34 U/L (ref 10–38)
BILIRUB SERPL-MCNC: 0.7 MG/DL (ref 0.2–1)
BUN SERPL-MCNC: 18 MG/DL (ref 7–18)
BUN/CREAT SERPL: 15 (ref 12–20)
CA-I BLD-MCNC: 9.5 MG/DL (ref 8.5–10.1)
CHLORIDE SERPL-SCNC: 103 MMOL/L (ref 100–111)
CO2 SERPL-SCNC: 29 MMOL/L (ref 21–32)
CREAT SERPL-MCNC: 1.21 MG/DL (ref 0.6–1.3)
ERYTHROCYTE [DISTWIDTH] IN BLOOD BY AUTOMATED COUNT: 13.2 % (ref 11.6–14.5)
GLOBULIN SER CALC-MCNC: 4.1 G/DL (ref 2–4)
GLUCOSE SERPL-MCNC: 98 MG/DL (ref 74–99)
HCT VFR BLD AUTO: 49.7 % (ref 36–48)
HGB BLD-MCNC: 16.6 G/DL (ref 13–16)
MCH RBC QN AUTO: 30.2 PG (ref 24–34)
MCHC RBC AUTO-ENTMCNC: 33.4 G/DL (ref 31–37)
MCV RBC AUTO: 90.5 FL (ref 78–100)
NRBC # BLD: 0 K/UL (ref 0–0.01)
NRBC BLD-RTO: 0 PER 100 WBC
PLATELET # BLD AUTO: 284 K/UL (ref 135–420)
PMV BLD AUTO: 9.5 FL (ref 9.2–11.8)
POTASSIUM SERPL-SCNC: 3.6 MMOL/L (ref 3.5–5.5)
PROT SERPL-MCNC: 8.2 G/DL (ref 6.4–8.2)
RBC # BLD AUTO: 5.49 M/UL (ref 4.35–5.65)
SODIUM SERPL-SCNC: 137 MMOL/L (ref 136–145)
TSH SERPL DL<=0.05 MIU/L-ACNC: 2.71 UIU/ML (ref 0.36–3.74)
WBC # BLD AUTO: 10.7 K/UL (ref 4.6–13.2)

## 2023-05-01 PROCEDURE — 83036 HEMOGLOBIN GLYCOSYLATED A1C: CPT

## 2023-05-01 PROCEDURE — 80053 COMPREHEN METABOLIC PANEL: CPT

## 2023-05-01 PROCEDURE — 82043 UR ALBUMIN QUANTITATIVE: CPT

## 2023-05-01 PROCEDURE — 82607 VITAMIN B-12: CPT

## 2023-05-01 PROCEDURE — 84443 ASSAY THYROID STIM HORMONE: CPT

## 2023-05-01 PROCEDURE — 82306 VITAMIN D 25 HYDROXY: CPT

## 2023-05-01 PROCEDURE — 84153 ASSAY OF PSA TOTAL: CPT

## 2023-05-01 PROCEDURE — 82570 ASSAY OF URINE CREATININE: CPT

## 2023-05-01 PROCEDURE — 80061 LIPID PANEL: CPT

## 2023-05-01 PROCEDURE — 85027 COMPLETE CBC AUTOMATED: CPT

## 2023-05-01 PROCEDURE — 87389 HIV-1 AG W/HIV-1&-2 AB AG IA: CPT

## 2023-05-01 PROCEDURE — 36415 COLL VENOUS BLD VENIPUNCTURE: CPT

## 2023-05-02 LAB
25(OH)D3 SERPL-MCNC: 27.8 NG/ML (ref 30–100)
CHOLEST SERPL-MCNC: 122 MG/DL
CREAT UR-MCNC: 132 MG/DL (ref 30–125)
EST. AVERAGE GLUCOSE BLD GHB EST-MCNC: 120 MG/DL
HBA1C MFR BLD: 5.8 % (ref 4.2–5.6)
HDLC SERPL-MCNC: 25 MG/DL (ref 40–60)
HDLC SERPL: 4.9 (ref 0–5)
HIV 1+2 AB+HIV1 P24 AG SERPL QL IA: NONREACTIVE
HIV 1/2 RESULT COMMENT: NORMAL
LDLC SERPL CALC-MCNC: 37.4 MG/DL (ref 0–100)
LIPID PANEL: ABNORMAL
MICROALBUMIN UR-MCNC: 3.17 MG/DL (ref 0–3)
MICROALBUMIN/CREAT UR-RTO: 24 MGMALB/GCRE (ref 0–30)
PSA SERPL-MCNC: 0 NG/ML (ref 0–4)
TRIGL SERPL-MCNC: 298 MG/DL
VIT B12 SERPL-MCNC: 418 PG/ML (ref 211–911)
VLDLC SERPL CALC-MCNC: 59.6 MG/DL

## 2023-05-08 ENCOUNTER — OFFICE VISIT (OUTPATIENT)
Dept: FAMILY MEDICINE CLINIC | Facility: CLINIC | Age: 68
End: 2023-05-08
Payer: MEDICARE

## 2023-05-08 VITALS
OXYGEN SATURATION: 95 % | RESPIRATION RATE: 19 BRPM | DIASTOLIC BLOOD PRESSURE: 85 MMHG | HEART RATE: 72 BPM | TEMPERATURE: 98.8 F | WEIGHT: 264 LBS | BODY MASS INDEX: 39.1 KG/M2 | SYSTOLIC BLOOD PRESSURE: 123 MMHG | HEIGHT: 69 IN

## 2023-05-08 DIAGNOSIS — Z85.46 HISTORY OF PROSTATE CANCER: ICD-10-CM

## 2023-05-08 DIAGNOSIS — Z00.00 INITIAL MEDICARE ANNUAL WELLNESS VISIT: Primary | ICD-10-CM

## 2023-05-08 DIAGNOSIS — Z12.11 SCREENING FOR MALIGNANT NEOPLASM OF COLON: ICD-10-CM

## 2023-05-08 DIAGNOSIS — E11.65 TYPE 2 DIABETES MELLITUS WITH HYPERGLYCEMIA, WITHOUT LONG-TERM CURRENT USE OF INSULIN (HCC): ICD-10-CM

## 2023-05-08 DIAGNOSIS — E78.2 MIXED HYPERLIPIDEMIA: ICD-10-CM

## 2023-05-08 DIAGNOSIS — E66.01 SEVERE OBESITY (BMI 35.0-39.9) WITH COMORBIDITY (HCC): ICD-10-CM

## 2023-05-08 PROCEDURE — 1123F ACP DISCUSS/DSCN MKR DOCD: CPT | Performed by: NURSE PRACTITIONER

## 2023-05-08 PROCEDURE — G0402 INITIAL PREVENTIVE EXAM: HCPCS | Performed by: NURSE PRACTITIONER

## 2023-05-08 PROCEDURE — G8427 DOCREV CUR MEDS BY ELIG CLIN: HCPCS | Performed by: NURSE PRACTITIONER

## 2023-05-08 PROCEDURE — 99214 OFFICE O/P EST MOD 30 MIN: CPT | Performed by: NURSE PRACTITIONER

## 2023-05-08 PROCEDURE — 2022F DILAT RTA XM EVC RTNOPTHY: CPT | Performed by: NURSE PRACTITIONER

## 2023-05-08 PROCEDURE — 3017F COLORECTAL CA SCREEN DOC REV: CPT | Performed by: NURSE PRACTITIONER

## 2023-05-08 PROCEDURE — 3044F HG A1C LEVEL LT 7.0%: CPT | Performed by: NURSE PRACTITIONER

## 2023-05-08 PROCEDURE — 3078F DIAST BP <80 MM HG: CPT | Performed by: NURSE PRACTITIONER

## 2023-05-08 PROCEDURE — G8417 CALC BMI ABV UP PARAM F/U: HCPCS | Performed by: NURSE PRACTITIONER

## 2023-05-08 PROCEDURE — 1036F TOBACCO NON-USER: CPT | Performed by: NURSE PRACTITIONER

## 2023-05-08 PROCEDURE — 3074F SYST BP LT 130 MM HG: CPT | Performed by: NURSE PRACTITIONER

## 2023-05-08 RX ORDER — AZELASTINE 1 MG/ML
SPRAY, METERED NASAL
COMMUNITY
Start: 2023-03-07

## 2023-05-08 RX ORDER — FLUTICASONE PROPIONATE 50 MCG
SPRAY, SUSPENSION (ML) NASAL
COMMUNITY
Start: 2023-03-06

## 2023-05-08 SDOH — ECONOMIC STABILITY: INCOME INSECURITY: HOW HARD IS IT FOR YOU TO PAY FOR THE VERY BASICS LIKE FOOD, HOUSING, MEDICAL CARE, AND HEATING?: NOT HARD AT ALL

## 2023-05-08 SDOH — ECONOMIC STABILITY: HOUSING INSECURITY
IN THE LAST 12 MONTHS, WAS THERE A TIME WHEN YOU DID NOT HAVE A STEADY PLACE TO SLEEP OR SLEPT IN A SHELTER (INCLUDING NOW)?: NO

## 2023-05-08 SDOH — ECONOMIC STABILITY: FOOD INSECURITY: WITHIN THE PAST 12 MONTHS, THE FOOD YOU BOUGHT JUST DIDN'T LAST AND YOU DIDN'T HAVE MONEY TO GET MORE.: NEVER TRUE

## 2023-05-08 SDOH — ECONOMIC STABILITY: FOOD INSECURITY: WITHIN THE PAST 12 MONTHS, YOU WORRIED THAT YOUR FOOD WOULD RUN OUT BEFORE YOU GOT MONEY TO BUY MORE.: NEVER TRUE

## 2023-05-08 ASSESSMENT — PATIENT HEALTH QUESTIONNAIRE - PHQ9
SUM OF ALL RESPONSES TO PHQ QUESTIONS 1-9: 0
2. FEELING DOWN, DEPRESSED OR HOPELESS: 0
1. LITTLE INTEREST OR PLEASURE IN DOING THINGS: 0
SUM OF ALL RESPONSES TO PHQ QUESTIONS 1-9: 0
SUM OF ALL RESPONSES TO PHQ9 QUESTIONS 1 & 2: 0

## 2023-05-08 ASSESSMENT — LIFESTYLE VARIABLES
HOW MANY STANDARD DRINKS CONTAINING ALCOHOL DO YOU HAVE ON A TYPICAL DAY: 1 OR 2
HOW OFTEN DO YOU HAVE A DRINK CONTAINING ALCOHOL: MONTHLY OR LESS

## 2023-05-13 NOTE — TELEPHONE ENCOUNTER
----- Message from [employer] [address] sent at 1/12/2022 12:54 PM EST -----  Subject: Message to Provider    QUESTIONS  Information for Provider? [facility] will be sending authorization   paperwork for Jardiance prescription to be sent to them instead of   [facility]. Pt would like call w/ verification when received and returned. ---------------------------------------------------------------------------  --------------  Daniel Ko INFO  What is the best way for the office to contact you? OK to leave message on   voicemail  Preferred Call Back Phone Number? [phone]  ---------------------------------------------------------------------------  --------------  SCRIPT ANSWERS  Relationship to Patient?  Self DKA DKA DKA DKA

## 2023-05-22 ASSESSMENT — ENCOUNTER SYMPTOMS
WHEEZING: 0
CHEST TIGHTNESS: 0
SHORTNESS OF BREATH: 0

## 2023-05-22 NOTE — PROGRESS NOTES
Carole Seals is a 74 y.o. male presents with   Chief Complaint   Patient presents with    Medicare AWV        Diagnosis   1. Initial Medicare annual wellness visit    Patient had all lab work conducted prior to today's visit. 2. Type 2 diabetes mellitus with hyperglycemia, without long-term current use of insulin ([address]    Patient is A1c in May 2023 of 5.8%. He is currently taking Jardiance 25 mg once daily in conjunction to metformin 1000 mg twice daily which she is tolerating without difficulty. 3. Screening for malignant neoplasm of colon       4. Severe obesity (BMI 35.0-39. 9) with comorbidity ([address]    Patient's BMI is 38.9 states he is working to improve dietary and lifestyle modifications     5. Mixed hyperlipidemia    Patient states he is tolerating his atorvastatin 40 mg at bedtime along with fish oil 2 tabs p.o. daily     6.  History of prostate cancer    Radical prostatectomy May 2011 last visit with urology 2021 he was cleared to follow with PCP for yearly screening most recent PSA May 2023 with a undetectable PSA     /85 (Site: Left Upper Arm, Position: Sitting, Cuff Size: Large Adult)   Pulse 72   Temp 98.8 °F (37.1 °C) (Temporal)   Resp 19   Ht 5' 9\" (1.753 m)   Wt 264 lb (119.7 kg)   SpO2 95%   BMI 38.99 kg/m²   Subjective:     Past Medical History:   Diagnosis Date    Diabetes ([address]     Hayfever     Hypertension     Obesity     Pneumonia 1985 or 1986    Prostate cancer ([facility] [address]     [id number] CaP 3+3    Sleep apnea     DAKOTAH (stress urinary incontinence), male      Past Surgical History:   Procedure Laterality Date    BIOPSY PROSTATE  03/02/2011    COLONOSCOPY  12/2019    HERNIA REPAIR  12/01/2015    [place] APPENDECTOMY  05/01/1977    PROSTATECTOMY  05/06/2011    VASECTOMY  1990 or 1991     Social History     Socioeconomic History    Marital status:      Spouse name: None    Number of children: None    Years of education: None    Highest education level: None   Tobacco Use

## 2023-06-02 RX ORDER — SEMAGLUTIDE 1.34 MG/ML
1 INJECTION, SOLUTION SUBCUTANEOUS
Qty: 3 ML | Refills: 2 | Status: SHIPPED | OUTPATIENT
Start: 2023-06-02

## 2023-06-27 ENCOUNTER — NURSE ONLY (OUTPATIENT)
Age: 68
End: 2023-06-27

## 2023-06-27 DIAGNOSIS — Z12.11 ENCOUNTER FOR SCREENING COLONOSCOPY: Primary | ICD-10-CM

## 2023-06-27 RX ORDER — POLYETHYLENE GLYCOL 3350, SODIUM SULFATE ANHYDROUS, SODIUM BICARBONATE, SODIUM CHLORIDE, POTASSIUM CHLORIDE 236; 22.74; 6.74; 5.86; 2.97 G/4L; G/4L; G/4L; G/4L; G/4L
4 POWDER, FOR SOLUTION ORAL ONCE
Qty: 4000 ML | Refills: 0 | Status: SHIPPED | OUTPATIENT
Start: 2023-06-27 | End: 2023-06-27

## 2023-06-30 RX ORDER — ATORVASTATIN CALCIUM 40 MG/1
TABLET, FILM COATED ORAL
Qty: 90 TABLET | Refills: 3 | Status: SHIPPED | OUTPATIENT
Start: 2023-06-30

## 2023-07-10 RX ORDER — SEMAGLUTIDE 1.34 MG/ML
1 INJECTION, SOLUTION SUBCUTANEOUS
Qty: 3 ML | Refills: 2 | Status: SHIPPED | OUTPATIENT
Start: 2023-07-10

## 2023-07-24 ENCOUNTER — ANESTHESIA EVENT (OUTPATIENT)
Age: 68
End: 2023-07-24
Payer: MEDICARE

## 2023-08-01 ENCOUNTER — PREP FOR PROCEDURE (OUTPATIENT)
Age: 68
End: 2023-08-01

## 2023-08-01 DIAGNOSIS — Z12.11 COLON CANCER SCREENING: Primary | ICD-10-CM

## 2023-08-01 RX ORDER — SODIUM CHLORIDE, SODIUM LACTATE, POTASSIUM CHLORIDE, CALCIUM CHLORIDE 600; 310; 30; 20 MG/100ML; MG/100ML; MG/100ML; MG/100ML
INJECTION, SOLUTION INTRAVENOUS CONTINUOUS
Status: CANCELLED | OUTPATIENT
Start: 2023-08-01

## 2023-08-01 NOTE — H&P
Open access updated H&P. Brief history: The patient is male White (non-) 74 y.o. who was referred for screening colonoscopy. Review of the records showed that they had few if any health problems, excessive obesity, or significant medications that would require office evaluation prior to colonoscopy for colon cancer screening. After review of their chart, contact was made with the patient in discussion and literature sent regarding the procedure and the bowel preparation. They are here now for their procedure. Past medical and surgical history:   Past Medical History:   Diagnosis Date    Diabetes ([facility])     Hayfever     Hypertension     Obesity     Pneumonia 1985 or 1986    Prostate cancer ([address])     [id number] CaP 3+3    Sleep apnea     DAKOTAH (stress urinary incontinence), male       Past Surgical History:   Procedure Laterality Date    BIOPSY PROSTATE  03/02/2011    COLONOSCOPY  12/2019    HERNIA REPAIR  12/01/2015    [place] APPENDECTOMY  05/01/1977    PROSTATECTOMY  05/06/2011    VASECTOMY  1990 or 1991        Allergies:  No Known Allergies     Medications:  No current facility-administered medications for this encounter.     Current Outpatient Medications:     Semaglutide, 1 MG/DOSE, (OZEMPIC, 1 MG/DOSE,) 4 MG/3ML SOPN, Inject 1 mg into the skin every 7 days, Disp: 3 mL, Rfl: 2    atorvastatin (LIPITOR) 40 MG tablet, TAKE 1 TABLET DAILY, Disp: 90 tablet, Rfl: 3    fluticasone (FLONASE) 50 MCG/ACT nasal spray, , Disp: , Rfl:     azelastine (ASTELIN) 0.1 % nasal spray, SPRAY 1 TO 2 SPRAYS PER NOSTRIL TWICE A DAY AS NEEDED FOR 30 DAYS USE IN COMBINATION WITH FLONASE, Disp: , Rfl:     hydroCHLOROthiazide (HYDRODIURIL) 25 MG tablet, TAKE 1 TABLET BY MOUTH DAILY, Disp: 90 tablet, Rfl: 2    amLODIPine-benazepril (LOTREL) 10-40 MG per capsule, TAKE 1 CAPSULE BY MOUTH DAILY, Disp: 90 capsule, Rfl: 1    aspirin 81 MG EC tablet, Take 1 tablet by mouth, Disp: , Rfl:     Omega-3 Fatty Acids (FISH OIL PO), Take by

## 2023-08-08 ENCOUNTER — HOSPITAL ENCOUNTER (OUTPATIENT)
Age: 68
Setting detail: OUTPATIENT SURGERY
Discharge: HOME | End: 2023-08-08
Attending: INTERNAL MEDICINE | Admitting: INTERNAL MEDICINE
Payer: MEDICARE

## 2023-08-08 ENCOUNTER — ANESTHESIA (OUTPATIENT)
Age: 68
End: 2023-08-08
Payer: MEDICARE

## 2023-08-08 VITALS
SYSTOLIC BLOOD PRESSURE: 112 MMHG | RESPIRATION RATE: 20 BRPM | HEART RATE: 80 BPM | OXYGEN SATURATION: 99 % | TEMPERATURE: 97.6 F | DIASTOLIC BLOOD PRESSURE: 69 MMHG | BODY MASS INDEX: 38.4 KG/M2 | WEIGHT: 260 LBS

## 2023-08-08 DIAGNOSIS — Z12.11 COLON CANCER SCREENING: ICD-10-CM

## 2023-08-08 LAB
GLUCOSE BLD STRIP.AUTO-MCNC: 102 MG/DL (ref 70–110)
PERFORMED BY:: NORMAL

## 2023-08-08 PROCEDURE — 82962 GLUCOSE BLOOD TEST: CPT

## 2023-08-08 PROCEDURE — 3700000001 HC ADD 15 MINUTES (ANESTHESIA): Performed by: INTERNAL MEDICINE

## 2023-08-08 PROCEDURE — 3600007502: Performed by: INTERNAL MEDICINE

## 2023-08-08 PROCEDURE — 2500000003 HC RX 250 WO HCPCS: Performed by: NURSE ANESTHETIST, CERTIFIED REGISTERED

## 2023-08-08 PROCEDURE — 3700000000 HC ANESTHESIA ATTENDED CARE: Performed by: INTERNAL MEDICINE

## 2023-08-08 PROCEDURE — 3600007512: Performed by: INTERNAL MEDICINE

## 2023-08-08 PROCEDURE — 2580000003 HC RX 258: Performed by: INTERNAL MEDICINE

## 2023-08-08 PROCEDURE — 88305 TISSUE EXAM BY PATHOLOGIST: CPT

## 2023-08-08 PROCEDURE — 45385 COLONOSCOPY W/LESION REMOVAL: CPT | Performed by: INTERNAL MEDICINE

## 2023-08-08 PROCEDURE — 7100000011 HC PHASE II RECOVERY - ADDTL 15 MIN: Performed by: INTERNAL MEDICINE

## 2023-08-08 PROCEDURE — 2709999900 HC NON-CHARGEABLE SUPPLY: Performed by: INTERNAL MEDICINE

## 2023-08-08 PROCEDURE — 7100000010 HC PHASE II RECOVERY - FIRST 15 MIN: Performed by: INTERNAL MEDICINE

## 2023-08-08 PROCEDURE — 6360000002 HC RX W HCPCS: Performed by: NURSE ANESTHETIST, CERTIFIED REGISTERED

## 2023-08-08 RX ORDER — SODIUM CHLORIDE, SODIUM LACTATE, POTASSIUM CHLORIDE, CALCIUM CHLORIDE 600; 310; 30; 20 MG/100ML; MG/100ML; MG/100ML; MG/100ML
INJECTION, SOLUTION INTRAVENOUS CONTINUOUS
Status: DISCONTINUED | OUTPATIENT
Start: 2023-08-08 | End: 2023-08-08 | Stop reason: HOSPADM

## 2023-08-08 RX ORDER — SODIUM CHLORIDE 0.9 % (FLUSH) 0.9 %
5-40 SYRINGE (ML) INJECTION PRN
Status: DISCONTINUED | OUTPATIENT
Start: 2023-08-08 | End: 2023-08-08 | Stop reason: HOSPADM

## 2023-08-08 RX ORDER — SODIUM CHLORIDE 0.9 % (FLUSH) 0.9 %
5-40 SYRINGE (ML) INJECTION EVERY 12 HOURS SCHEDULED
Status: DISCONTINUED | OUTPATIENT
Start: 2023-08-08 | End: 2023-08-08 | Stop reason: HOSPADM

## 2023-08-08 RX ORDER — SODIUM CHLORIDE 9 MG/ML
INJECTION, SOLUTION INTRAVENOUS PRN
Status: DISCONTINUED | OUTPATIENT
Start: 2023-08-08 | End: 2023-08-08 | Stop reason: HOSPADM

## 2023-08-08 RX ORDER — PROPOFOL 10 MG/ML
INJECTION, EMULSION INTRAVENOUS PRN
Status: DISCONTINUED | OUTPATIENT
Start: 2023-08-08 | End: 2023-08-08 | Stop reason: SDUPTHER

## 2023-08-08 RX ADMIN — PROPOFOL 100 MG: 10 INJECTION, EMULSION INTRAVENOUS at 09:35

## 2023-08-08 RX ADMIN — PROPOFOL 200 MG: 10 INJECTION, EMULSION INTRAVENOUS at 09:23

## 2023-08-08 RX ADMIN — LIDOCAINE HYDROCHLORIDE 50 MG: 20 INJECTION, SOLUTION INFILTRATION; PERINEURAL at 09:23

## 2023-08-08 RX ADMIN — SODIUM CHLORIDE, POTASSIUM CHLORIDE, SODIUM LACTATE AND CALCIUM CHLORIDE: 600; 310; 30; 20 INJECTION, SOLUTION INTRAVENOUS at 08:12

## 2023-08-08 RX ADMIN — PROPOFOL 50 MG: 10 INJECTION, EMULSION INTRAVENOUS at 09:28

## 2023-08-08 ASSESSMENT — PAIN - FUNCTIONAL ASSESSMENT: PAIN_FUNCTIONAL_ASSESSMENT: NONE - DENIES PAIN

## 2023-08-08 NOTE — ANESTHESIA POSTPROCEDURE EVALUATION
Department of Anesthesiology  Postprocedure Note    Patient: Rukhsana Galvan  MRN: [id number]  YOB: 1955  Date of evaluation: 8/8/2023      Procedure Summary     Date: 08/08/23 Room / Location: [facility] ENDO 01 / [facility] ENDOSCOPY    Anesthesia Start: 0910 Anesthesia Stop: 2838    Procedure: Colonoscopy withn polypectomy (Rectum) Diagnosis:       Special screening for malignant neoplasms, colon      (Special screening for malignant neoplasms, colon [Z12.11])    Surgeons: Liz Child MD Responsible Provider: JAMEY Adame CRNA    Anesthesia Type: MAC, TIVA ASA Status: 2          Anesthesia Type: No value filed.     Lauryn Phase I:      Lauryn Phase II:        Anesthesia Post Evaluation    Patient location during evaluation: bedside  Patient participation: complete - patient participated  Level of consciousness: awake and awake and alert  Pain score: 0  Airway patency: patent  Nausea & Vomiting: no nausea  Complications: no  Cardiovascular status: blood pressure returned to baseline  Respiratory status: acceptable  Hydration status: euvolemic  Multimodal analgesia pain management approach  Pain management: adequate

## 2023-08-08 NOTE — INTERVAL H&P NOTE
Update History & Physical    The patient's History and Physical of August 8, 2023 was reviewed with the patient and I examined the patient. There was no change. The surgical site was confirmed by the patient and me. Plan: The risks, benefits, expected outcome, and alternative to the recommended procedure have been discussed with the patient. Patient understands and wants to proceed with the procedure.      Electronically signed by Liz Child MD on 8/8/2023 at 9:07 AM

## 2023-08-08 NOTE — DISCHARGE INSTRUCTIONS
Impression:  Removal of ascending colon sessile polyp as above. Sigmoid diverticulosis. Grade 1 internal hemorrhoids. Suboptimal bowel preparation. Therefore, cannot exclude lesions and polyps were retained stool remained. Recommendations:  Check pathology. Will notify patient with results when they are available. Repeat colonoscopy in  3-5 years for surveillance. Follow up as needed.

## 2023-08-08 NOTE — OP NOTE
Colonoscopy procedure note    Date of service: 8/8/2023    Type: Surveillance     Indication for procedure: History of colon polyps    Anesthesia classification: ASA class 2    Patient history and physical been accomplished and documented. Patient is assessed and determined to be appropriate candidate for planned procedure and sedation; patient reassessed immediately prior to sedation. Sedation plan: MAC per anesthesia    Surgical assistant: Not applicable    Airway assessment: Range of motion: Normal, mouth opening, Visual obstruction: No.    UPDATED PREOP EXAM:  Unchanged. VS: Reviewed  Gen: in NAD  CV: RRR, no murmur  Resp: CTA  Abd: Soft, NTND, +BS  Extrem: No cyanosis or edema  Neuro: Awake and alert    Informed consent obtained: Yes. The indications, risks including but not limited to bleeding, perforation, infection, death, and potential failure to visual areas are diagnosed neoplasia, alternatives and benefits were discussed with the patient prior to the procedure. Patient identity and procedure was verified, absent was obtained, and is consistent with the consent form found in the patient's records. PROCEDURE PERFORMED:  COLONOSCOPY  to the cecum with MAC and cold snare polypectomy and sessile ascending colon polyp. INSTRUMENT: Olympus colonoscope per nursing notes. FINDINGS:    External anal lesions: Normal   Rectum: normal .   Retroflexion view: Grade 1 internal hemorrhoids. Sigmoid: normal except for diverticulosis  Descending Colon: normal   Transverse Colon: normal   Ascending Colon: normal except for a slightly less than 1 cm sessile polyp removed by cold snare polypectomy without incident. Cecum: normal, including the appendiceal orifice and ileocecal valve. Terminal ileum: not evaluated     Specimens:  1. Cold snare polypectomy of ascending colon sessile polyp. Bowel preparation- Inadequate to detect small (5mm) polyps or larger.   There was thick liquid stool scattered

## 2023-09-12 ENCOUNTER — TELEPHONE (OUTPATIENT)
Dept: FAMILY MEDICINE CLINIC | Facility: CLINIC | Age: 68
End: 2023-09-12

## 2023-09-12 ENCOUNTER — HOSPITAL ENCOUNTER (OUTPATIENT)
Age: 68
Discharge: HOME | End: 2023-09-15
Payer: MEDICARE

## 2023-09-12 DIAGNOSIS — E78.2 MIXED HYPERLIPIDEMIA: ICD-10-CM

## 2023-09-12 DIAGNOSIS — I10 ESSENTIAL (PRIMARY) HYPERTENSION: ICD-10-CM

## 2023-09-12 DIAGNOSIS — Z11.59 NEED FOR HEPATITIS B SCREENING TEST: ICD-10-CM

## 2023-09-12 DIAGNOSIS — E55.9 VITAMIN D DEFICIENCY, UNSPECIFIED: ICD-10-CM

## 2023-09-12 DIAGNOSIS — E11.65 TYPE 2 DIABETES MELLITUS WITH HYPERGLYCEMIA, WITHOUT LONG-TERM CURRENT USE OF INSULIN (HCC): Primary | ICD-10-CM

## 2023-09-12 DIAGNOSIS — E11.65 TYPE 2 DIABETES MELLITUS WITH HYPERGLYCEMIA, WITHOUT LONG-TERM CURRENT USE OF INSULIN (HCC): ICD-10-CM

## 2023-09-12 LAB
ALBUMIN SERPL-MCNC: 3.9 G/DL (ref 3.4–5)
ALBUMIN/GLOB SERPL: 1.1 (ref 0.8–1.7)
ALP SERPL-CCNC: 78 U/L (ref 45–117)
ALT SERPL-CCNC: 59 U/L (ref 16–61)
ANION GAP SERPL CALC-SCNC: 7 MMOL/L (ref 3–18)
AST SERPL W P-5'-P-CCNC: 24 U/L (ref 10–38)
BILIRUB SERPL-MCNC: 0.8 MG/DL (ref 0.2–1)
BUN SERPL-MCNC: 13 MG/DL (ref 7–18)
BUN/CREAT SERPL: 14 (ref 12–20)
CA-I BLD-MCNC: 8.8 MG/DL (ref 8.5–10.1)
CHLORIDE SERPL-SCNC: 104 MMOL/L (ref 100–111)
CO2 SERPL-SCNC: 28 MMOL/L (ref 21–32)
CREAT SERPL-MCNC: 0.94 MG/DL (ref 0.6–1.3)
GLOBULIN SER CALC-MCNC: 3.7 G/DL (ref 2–4)
GLUCOSE SERPL-MCNC: 85 MG/DL (ref 74–99)
POTASSIUM SERPL-SCNC: 3.5 MMOL/L (ref 3.5–5.5)
PROT SERPL-MCNC: 7.6 G/DL (ref 6.4–8.2)
SODIUM SERPL-SCNC: 139 MMOL/L (ref 136–145)

## 2023-09-12 PROCEDURE — 80061 LIPID PANEL: CPT

## 2023-09-12 PROCEDURE — 80053 COMPREHEN METABOLIC PANEL: CPT

## 2023-09-12 PROCEDURE — 86706 HEP B SURFACE ANTIBODY: CPT

## 2023-09-12 PROCEDURE — 36415 COLL VENOUS BLD VENIPUNCTURE: CPT

## 2023-09-12 PROCEDURE — 82306 VITAMIN D 25 HYDROXY: CPT

## 2023-09-12 PROCEDURE — 87340 HEPATITIS B SURFACE AG IA: CPT

## 2023-09-12 PROCEDURE — 83036 HEMOGLOBIN GLYCOSYLATED A1C: CPT

## 2023-09-12 PROCEDURE — 86704 HEP B CORE ANTIBODY TOTAL: CPT

## 2023-09-13 LAB
CHOLEST SERPL-MCNC: 98 MG/DL
EST. AVERAGE GLUCOSE BLD GHB EST-MCNC: 108 MG/DL
HBA1C MFR BLD: 5.4 % (ref 4.2–5.6)
HBV SURFACE AB SER QL: NONREACTIVE
HBV SURFACE AB SER-ACNC: 7.26 MIU/ML
HDLC SERPL-MCNC: 27 MG/DL (ref 40–60)
HDLC SERPL: 3.6 (ref 0–5)
LDLC SERPL CALC-MCNC: 32.6 MG/DL (ref 0–100)
LIPID PANEL: ABNORMAL
TRIGL SERPL-MCNC: 192 MG/DL
VLDLC SERPL CALC-MCNC: 38.4 MG/DL

## 2023-09-14 LAB — HBV CORE AB SERPL QL IA: NEGATIVE

## 2023-09-14 RX ORDER — SEMAGLUTIDE 1.34 MG/ML
INJECTION, SOLUTION SUBCUTANEOUS
Qty: 3 ML | Refills: 12 | Status: SHIPPED | OUTPATIENT
Start: 2023-09-14

## 2023-09-15 LAB
25(OH)D3 SERPL-MCNC: 38.7 NG/ML (ref 30–100)
HBV SURFACE AG SER QL: <0.1 INDEX
HBV SURFACE AG SER QL: NEGATIVE

## 2023-09-19 ENCOUNTER — OFFICE VISIT (OUTPATIENT)
Dept: FAMILY MEDICINE CLINIC | Facility: CLINIC | Age: 68
End: 2023-09-19
Payer: MEDICARE

## 2023-09-19 VITALS
WEIGHT: 250.8 LBS | SYSTOLIC BLOOD PRESSURE: 100 MMHG | DIASTOLIC BLOOD PRESSURE: 66 MMHG | OXYGEN SATURATION: 98 % | TEMPERATURE: 98.3 F | HEART RATE: 86 BPM | BODY MASS INDEX: 37.15 KG/M2 | RESPIRATION RATE: 19 BRPM | HEIGHT: 69 IN

## 2023-09-19 DIAGNOSIS — I10 ESSENTIAL (PRIMARY) HYPERTENSION: ICD-10-CM

## 2023-09-19 DIAGNOSIS — E11.9 TYPE 2 DIABETES MELLITUS WITHOUT COMPLICATION, WITHOUT LONG-TERM CURRENT USE OF INSULIN (HCC): Primary | ICD-10-CM

## 2023-09-19 PROCEDURE — 3074F SYST BP LT 130 MM HG: CPT | Performed by: NURSE PRACTITIONER

## 2023-09-19 PROCEDURE — 2022F DILAT RTA XM EVC RTNOPTHY: CPT | Performed by: NURSE PRACTITIONER

## 2023-09-19 PROCEDURE — 1036F TOBACCO NON-USER: CPT | Performed by: NURSE PRACTITIONER

## 2023-09-19 PROCEDURE — 99214 OFFICE O/P EST MOD 30 MIN: CPT | Performed by: NURSE PRACTITIONER

## 2023-09-19 PROCEDURE — G8427 DOCREV CUR MEDS BY ELIG CLIN: HCPCS | Performed by: NURSE PRACTITIONER

## 2023-09-19 PROCEDURE — 3078F DIAST BP <80 MM HG: CPT | Performed by: NURSE PRACTITIONER

## 2023-09-19 PROCEDURE — G8417 CALC BMI ABV UP PARAM F/U: HCPCS | Performed by: NURSE PRACTITIONER

## 2023-09-19 PROCEDURE — 3017F COLORECTAL CA SCREEN DOC REV: CPT | Performed by: NURSE PRACTITIONER

## 2023-09-19 PROCEDURE — 1124F ACP DISCUSS-NO DSCNMKR DOCD: CPT | Performed by: NURSE PRACTITIONER

## 2023-09-19 PROCEDURE — 3044F HG A1C LEVEL LT 7.0%: CPT | Performed by: NURSE PRACTITIONER

## 2023-09-19 RX ORDER — MULTIVIT-MINERALS/FA/LYCOPENE 400-370MCG
1 TABLET ORAL DAILY
COMMUNITY

## 2023-09-19 RX ORDER — LORATADINE 10 MG/1
10 TABLET ORAL 2 TIMES DAILY
COMMUNITY

## 2023-09-19 RX ORDER — LISINOPRIL 40 MG/1
40 TABLET ORAL DAILY
Qty: 30 TABLET | Refills: 1 | Status: SHIPPED | OUTPATIENT
Start: 2023-09-19

## 2023-09-19 RX ORDER — AZELASTINE HYDROCHLORIDE 0.5 MG/ML
1 SOLUTION/ DROPS OPHTHALMIC 2 TIMES DAILY PRN
COMMUNITY

## 2023-09-19 RX ORDER — ONDANSETRON 8 MG/1
8 TABLET, ORALLY DISINTEGRATING ORAL EVERY 8 HOURS PRN
Qty: 30 TABLET | Refills: 1 | Status: SHIPPED | OUTPATIENT
Start: 2023-09-19

## 2023-09-19 ASSESSMENT — ENCOUNTER SYMPTOMS
WHEEZING: 0
SHORTNESS OF BREATH: 0
CHEST TIGHTNESS: 0

## 2023-09-19 NOTE — PROGRESS NOTES
Rukhsana Galvan presents today for   Chief Complaint   Patient presents with    Follow-up    Diabetes       Is someone accompanying this pt? Wife -     Is the patient using any DME equipment during [address]? no    Depression Screenin/8/2023     6:13 PM 10/20/2022     5:15 PM 2022     3:57 PM 2022     4:35 PM   PHQ-9 Questionaire   Little interest or pleasure in doing things 0 0 0 0   Feeling down, depressed, or hopeless 0 0 0 2   PHQ-9 Total Score 0 0 0 2       Fall Risk      2023     6:13 PM   Fall Risk   2 or more falls in past year? no   Fall with injury in past year? no        Health Maintenance reviewed and discussed and ordered per Provider. Health Maintenance Due   Topic Date Due    Hepatitis B vaccine (1 of 3 - Risk 3-dose series) Never done    Flu vaccine (1) 2023   . Coordination of Care:    1. \"Have you been to the ER, urgent care clinic since your last visit? Hospitalized since your last visit? \" No    2. \"Have you seen or consulted any other health care providers outside of the [address] [place] [address] since your last visit? \" No     3. For patients aged 43-73: Has the patient had a colonoscopy / FIT/ Cologuard? Yes - no Care Gap present      If the patient is female:    4. For patients aged 43-66: Has the patient had a mammogram within the past 2 years? NA - based on age or sex      11. For patients aged 21-65: Has the patient had a pap smear?  NA - based on age or sex
activity: Not Currently     Partners: Female     Social Determinants of Health     Financial Resource Strain: Low Risk  (5/8/2023)    Overall Financial Resource Strain (CARDIA)     Difficulty of Paying Living Expenses: Not hard at all   Food Insecurity: No Food Insecurity (5/8/2023)    Hunger Vital Sign     Worried About Running Out of Food in the Last Year: Never true     Ran Out of Food in the Last Year: Never true   Transportation Needs: Unknown (5/8/2023)    PRAPARE - Transportation     Lack of Transportation (Non-Medical): No   Physical Activity: Inactive (5/8/2023)    Exercise Vital Sign     Days of Exercise per Week: 0 days     Minutes of Exercise per Session: 0 min   Housing Stability: Unknown (5/8/2023)    Housing Stability Vital Sign     Unstable Housing in the Last Year: No       Allergies   Allergen Reactions    Other Other (See Comments)     Seasonal allergies     The patient has a family history of    Current Outpatient Medications   Medication Instructions    aspirin 81 mg, Oral    atorvastatin (LIPITOR) 40 MG tablet TAKE 1 TABLET DAILY    azelastine (ASTELIN) 0.1 % nasal spray SPRAY 1 TO 2 SPRAYS PER NOSTRIL TWICE A DAY AS NEEDED FOR 30 DAYS USE IN COMBINATION WITH FLONASE    azelastine (OPTIVAR) 0.05 % ophthalmic solution 1 drop, Both Eyes, 2 TIMES DAILY PRN    fluticasone (FLONASE) 50 MCG/ACT nasal spray No dose, route, or frequency recorded.     hydroCHLOROthiazide (HYDRODIURIL) 25 MG tablet TAKE 1 TABLET BY MOUTH DAILY    lisinopril (PRINIVIL;ZESTRIL) 40 mg, Oral, DAILY    loratadine (CLARITIN) 10 mg, Oral, 2 times daily    metFORMIN (GLUCOPHAGE) 1000 MG tablet TAKE 1 TABLET BY MOUTH TWICE DAILY WITH MEALS    Multiple Vitamins-Minerals (ONE-A-DAY MENS 50+) TABS 1 tablet, Oral, DAILY    Omega-3 Fatty Acids (FISH OIL) 1000 MG capsule 1 capsule, Oral, DAILY    OZEMPIC, 1 MG/DOSE, 4 MG/3ML SOPN INJECT 1 MG UNDER THE SKIN EVERY 7 DAYS         REVIEW OF SYSTEMS  Review of Systems   Respiratory:

## 2023-09-20 RX ORDER — LISINOPRIL 40 MG/1
40 TABLET ORAL DAILY
Qty: 90 TABLET | OUTPATIENT
Start: 2023-09-20

## 2023-09-21 RX ORDER — AMLODIPINE AND BENAZEPRIL HYDROCHLORIDE 10; 40 MG/1; MG/1
1 CAPSULE ORAL DAILY
Qty: 90 CAPSULE | Refills: 1 | OUTPATIENT
Start: 2023-09-21

## 2023-10-06 RX ORDER — HYDROCHLOROTHIAZIDE 25 MG/1
25 TABLET ORAL DAILY
Qty: 90 TABLET | Refills: 3 | Status: SHIPPED | OUTPATIENT
Start: 2023-10-06

## 2023-10-06 RX ORDER — LISINOPRIL 40 MG/1
40 TABLET ORAL DAILY
Qty: 90 TABLET | Refills: 3 | Status: SHIPPED | OUTPATIENT
Start: 2023-10-06

## 2023-11-17 RX ORDER — SEMAGLUTIDE 2.68 MG/ML
2 INJECTION, SOLUTION SUBCUTANEOUS
Qty: 9 ML | Refills: 2 | Status: SHIPPED | OUTPATIENT
Start: 2023-11-17

## 2023-11-28 RX ORDER — SEMAGLUTIDE 2.68 MG/ML
2 INJECTION, SOLUTION SUBCUTANEOUS
Qty: 9 ML | Refills: 2 | Status: SHIPPED | OUTPATIENT
Start: 2023-11-28

## 2023-12-21 ENCOUNTER — OFFICE VISIT (OUTPATIENT)
Dept: FAMILY MEDICINE CLINIC | Facility: CLINIC | Age: 68
End: 2023-12-21
Payer: MEDICARE

## 2023-12-21 VITALS
TEMPERATURE: 97.5 F | HEART RATE: 73 BPM | OXYGEN SATURATION: 95 % | HEIGHT: 69 IN | SYSTOLIC BLOOD PRESSURE: 138 MMHG | BODY MASS INDEX: 39.13 KG/M2 | WEIGHT: 264.2 LBS | DIASTOLIC BLOOD PRESSURE: 88 MMHG

## 2023-12-21 DIAGNOSIS — E11.65 TYPE 2 DIABETES MELLITUS WITH HYPERGLYCEMIA, WITHOUT LONG-TERM CURRENT USE OF INSULIN (HCC): Primary | ICD-10-CM

## 2023-12-21 LAB — HBA1C MFR BLD: 5.9 %

## 2023-12-21 PROCEDURE — 1124F ACP DISCUSS-NO DSCNMKR DOCD: CPT | Performed by: NURSE PRACTITIONER

## 2023-12-21 PROCEDURE — 83036 HEMOGLOBIN GLYCOSYLATED A1C: CPT | Performed by: NURSE PRACTITIONER

## 2023-12-21 PROCEDURE — G8427 DOCREV CUR MEDS BY ELIG CLIN: HCPCS | Performed by: NURSE PRACTITIONER

## 2023-12-21 PROCEDURE — G8417 CALC BMI ABV UP PARAM F/U: HCPCS | Performed by: NURSE PRACTITIONER

## 2023-12-21 PROCEDURE — 3044F HG A1C LEVEL LT 7.0%: CPT | Performed by: NURSE PRACTITIONER

## 2023-12-21 PROCEDURE — 99213 OFFICE O/P EST LOW 20 MIN: CPT | Performed by: NURSE PRACTITIONER

## 2023-12-21 PROCEDURE — G8484 FLU IMMUNIZE NO ADMIN: HCPCS | Performed by: NURSE PRACTITIONER

## 2023-12-21 PROCEDURE — 3017F COLORECTAL CA SCREEN DOC REV: CPT | Performed by: NURSE PRACTITIONER

## 2023-12-21 PROCEDURE — 3079F DIAST BP 80-89 MM HG: CPT | Performed by: NURSE PRACTITIONER

## 2023-12-21 PROCEDURE — 1036F TOBACCO NON-USER: CPT | Performed by: NURSE PRACTITIONER

## 2023-12-21 PROCEDURE — 3075F SYST BP GE 130 - 139MM HG: CPT | Performed by: NURSE PRACTITIONER

## 2023-12-21 PROCEDURE — 2022F DILAT RTA XM EVC RTNOPTHY: CPT | Performed by: NURSE PRACTITIONER

## 2023-12-21 RX ORDER — SEMAGLUTIDE 1.34 MG/ML
INJECTION, SOLUTION SUBCUTANEOUS
Qty: 18 ML | Refills: 2 | Status: SHIPPED | OUTPATIENT
Start: 2023-12-21 | End: 2023-12-21 | Stop reason: SDUPTHER

## 2023-12-21 RX ORDER — SEMAGLUTIDE 1.34 MG/ML
1 INJECTION, SOLUTION SUBCUTANEOUS
COMMUNITY
Start: 2023-12-03

## 2023-12-21 RX ORDER — SEMAGLUTIDE 1.34 MG/ML
INJECTION, SOLUTION SUBCUTANEOUS
Qty: 18 ML | Refills: 2 | Status: SHIPPED | OUTPATIENT
Start: 2023-12-21

## 2023-12-21 NOTE — PROGRESS NOTES
Sven Vieira presents today for   Chief Complaint   Patient presents with    3 Month Follow-Up       Is someone accompanying this pt? No    Is the patient using any DME equipment during OV? No     Health Maintenance reviewed and discussed and ordered per Provider. There are no preventive care reminders to display for this patient. .      Coordination of Care:  1. \"Have you been to the ER, urgent care clinic since your last visit? Hospitalized since your last visit? \" No    2. \"Have you seen or consulted any other health care providers outside of the [address] [place] [address] since your last visit? \" [facility]    3. For patients aged 43-73: Has the patient had a colonoscopy? Yes- No care gap present    Fingerstick for HBA1C done in left hand second finger by Ly Sanchez MA per order of Ana Luisa Christie NP after cleaning area with alcohol wipe. Patient tolerated procedure well.

## 2024-03-11 ENCOUNTER — TELEPHONE (OUTPATIENT)
Dept: FAMILY MEDICINE CLINIC | Facility: CLINIC | Age: 69
End: 2024-03-11

## 2024-03-11 ENCOUNTER — OFFICE VISIT (OUTPATIENT)
Age: 69
End: 2024-03-11
Payer: MEDICARE

## 2024-03-11 VITALS
SYSTOLIC BLOOD PRESSURE: 120 MMHG | BODY MASS INDEX: 37.36 KG/M2 | OXYGEN SATURATION: 98 % | HEART RATE: 88 BPM | DIASTOLIC BLOOD PRESSURE: 84 MMHG | WEIGHT: 253 LBS

## 2024-03-11 DIAGNOSIS — I10 HYPERTENSION, UNSPECIFIED TYPE: Primary | ICD-10-CM

## 2024-03-11 PROCEDURE — 99214 OFFICE O/P EST MOD 30 MIN: CPT | Performed by: INTERNAL MEDICINE

## 2024-03-11 PROCEDURE — G8428 CUR MEDS NOT DOCUMENT: HCPCS | Performed by: INTERNAL MEDICINE

## 2024-03-11 PROCEDURE — 1124F ACP DISCUSS-NO DSCNMKR DOCD: CPT | Performed by: INTERNAL MEDICINE

## 2024-03-11 PROCEDURE — 3017F COLORECTAL CA SCREEN DOC REV: CPT | Performed by: INTERNAL MEDICINE

## 2024-03-11 PROCEDURE — 93000 ELECTROCARDIOGRAM COMPLETE: CPT | Performed by: INTERNAL MEDICINE

## 2024-03-11 PROCEDURE — 1036F TOBACCO NON-USER: CPT | Performed by: INTERNAL MEDICINE

## 2024-03-11 PROCEDURE — G8484 FLU IMMUNIZE NO ADMIN: HCPCS | Performed by: INTERNAL MEDICINE

## 2024-03-11 PROCEDURE — 3074F SYST BP LT 130 MM HG: CPT | Performed by: INTERNAL MEDICINE

## 2024-03-11 PROCEDURE — G8417 CALC BMI ABV UP PARAM F/U: HCPCS | Performed by: INTERNAL MEDICINE

## 2024-03-11 PROCEDURE — 3079F DIAST BP 80-89 MM HG: CPT | Performed by: INTERNAL MEDICINE

## 2024-03-11 NOTE — PROGRESS NOTES
[facility]    Jb Turner is 68 y.o. male with a history of diabetes, hypertension, hyperlipidemia and prostate cancer      Patient is here today for follow-up appointment.  He denies any prior history of MI or CHF.   He denies any hospital admission or ER visits since last time.  He denies any chest pain or chest tightness.   Denies any palpitation, presyncope or syncope.  Denies any lower extremity swelling.  No shortness of breath.  Able to perform active daily living without any limitation.   Denies any nausea, vomiting, abdominal pain, fever, chills, sputum production. No hematuria or other bleeding complaints     Past Medical History:   Diagnosis Date    Diabetes (HCC)     Hayfever     Hypertension     Obesity     Pneumonia 1985 or 1986    Prostate cancer (HCC)     [id number] CaP 3+3    Sleep apnea     DAKOTAH (stress urinary incontinence), male        Review of Systems:  Cardiac symptoms as noted above in HPI. All others negative.    Current Outpatient Medications   Medication Sig    Semaglutide, 1 MG/DOSE, (OZEMPIC, 1 MG/DOSE,) 4 MG/3ML SOPN Conduct (2) 1 mg injections sc q7d (as the ozempic 2 mg dosing is on backorder)    hydroCHLOROthiazide (HYDRODIURIL) 25 MG tablet TAKE 1 TABLET BY MOUTH DAILY    Semaglutide, 2 MG/DOSE, (OZEMPIC, 2 MG/DOSE,) 8 MG/3ML SOPN Inject 2 mg into the skin every 7 days (Patient taking differently: Inject 0.75 mLs into the skin every 7 days Not taking yet on backorder)    lisinopril (PRINIVIL;ZESTRIL) 40 MG tablet Take 1 tablet by mouth daily    metFORMIN (GLUCOPHAGE) 1000 MG tablet Take 1 tablet by mouth with breakfast and with evening meal    Multiple Vitamins-Minerals (ONE-A-DAY MENS 50+) TABS Take 1 tablet by mouth daily    azelastine (OPTIVAR) 0.05 % ophthalmic solution Place 1 drop into both eyes 2 times daily as needed    loratadine (CLARITIN) 10 MG tablet Take 1 tablet by mouth in the morning and at bedtime

## 2024-03-18 ENCOUNTER — OFFICE VISIT (OUTPATIENT)
Dept: FAMILY MEDICINE CLINIC | Facility: CLINIC | Age: 69
End: 2024-03-18
Payer: MEDICARE

## 2024-03-18 VITALS
SYSTOLIC BLOOD PRESSURE: 113 MMHG | WEIGHT: 255.6 LBS | DIASTOLIC BLOOD PRESSURE: 81 MMHG | TEMPERATURE: 98.7 F | HEIGHT: 69 IN | BODY MASS INDEX: 37.86 KG/M2 | RESPIRATION RATE: 18 BRPM | OXYGEN SATURATION: 98 % | HEART RATE: 84 BPM

## 2024-03-18 DIAGNOSIS — E11.65 TYPE 2 DIABETES MELLITUS WITH HYPERGLYCEMIA, WITHOUT LONG-TERM CURRENT USE OF INSULIN (HCC): ICD-10-CM

## 2024-03-18 DIAGNOSIS — E66.01 SEVERE OBESITY (BMI 35.0-39.9) WITH COMORBIDITY (HCC): ICD-10-CM

## 2024-03-18 DIAGNOSIS — Z00.00 WELLNESS EXAMINATION: Primary | ICD-10-CM

## 2024-03-18 PROCEDURE — 3074F SYST BP LT 130 MM HG: CPT | Performed by: NURSE PRACTITIONER

## 2024-03-18 PROCEDURE — G8484 FLU IMMUNIZE NO ADMIN: HCPCS | Performed by: NURSE PRACTITIONER

## 2024-03-18 PROCEDURE — 3046F HEMOGLOBIN A1C LEVEL >9.0%: CPT | Performed by: NURSE PRACTITIONER

## 2024-03-18 PROCEDURE — 2022F DILAT RTA XM EVC RTNOPTHY: CPT | Performed by: NURSE PRACTITIONER

## 2024-03-18 PROCEDURE — 1124F ACP DISCUSS-NO DSCNMKR DOCD: CPT | Performed by: NURSE PRACTITIONER

## 2024-03-18 PROCEDURE — 3079F DIAST BP 80-89 MM HG: CPT | Performed by: NURSE PRACTITIONER

## 2024-03-18 PROCEDURE — 3017F COLORECTAL CA SCREEN DOC REV: CPT | Performed by: NURSE PRACTITIONER

## 2024-03-18 PROCEDURE — 99213 OFFICE O/P EST LOW 20 MIN: CPT | Performed by: NURSE PRACTITIONER

## 2024-03-18 PROCEDURE — 1036F TOBACCO NON-USER: CPT | Performed by: NURSE PRACTITIONER

## 2024-03-18 PROCEDURE — G8417 CALC BMI ABV UP PARAM F/U: HCPCS | Performed by: NURSE PRACTITIONER

## 2024-03-18 PROCEDURE — G8427 DOCREV CUR MEDS BY ELIG CLIN: HCPCS | Performed by: NURSE PRACTITIONER

## 2024-03-18 RX ORDER — SEMAGLUTIDE 2.68 MG/ML
2 INJECTION, SOLUTION SUBCUTANEOUS
Qty: 9 ML | Refills: 2 | Status: SHIPPED | OUTPATIENT
Start: 2024-03-18

## 2024-03-18 RX ORDER — SEMAGLUTIDE 1.34 MG/ML
INJECTION, SOLUTION SUBCUTANEOUS
Qty: 18 ML | Refills: 2 | Status: SHIPPED | OUTPATIENT
Start: 2024-03-18

## 2024-03-18 ASSESSMENT — PATIENT HEALTH QUESTIONNAIRE - PHQ9
SUM OF ALL RESPONSES TO PHQ QUESTIONS 1-9: 0
SUM OF ALL RESPONSES TO PHQ9 QUESTIONS 1 & 2: 0
SUM OF ALL RESPONSES TO PHQ QUESTIONS 1-9: 0
1. LITTLE INTEREST OR PLEASURE IN DOING THINGS: NOT AT ALL
2. FEELING DOWN, DEPRESSED OR HOPELESS: NOT AT ALL

## 2024-03-18 ASSESSMENT — ENCOUNTER SYMPTOMS
VOMITING: 0
ABDOMINAL PAIN: 0
CONSTIPATION: 1

## 2024-03-18 NOTE — PROGRESS NOTES
Jb Turner is a 68 y.o. male presents with   Chief Complaint   Patient presents with    Follow-up        Diagnosis   1. Wellness examination                         2. Type 2 diabetes mellitus with hyperglycemia, without long-term current use of insulin (HCC)    Patient presents for 3-month follow-up related to increase on Ozempic to 2 mg q. 7 days.  Patient states he is tolerating activation \"okay \".  He is lost proximately 10 pounds since increasing the medication December 2023         3. Severe obesity (BMI 35.0-39.9) with comorbidity (HCC)    BMI currently 37.75 with a weight of 255 pounds     /81 (Site: Left Upper Arm, Position: Sitting, Cuff Size: Large Adult)   Pulse 84   Temp 98.7 °F (37.1 °C) (Temporal)   Resp 18   Ht 1.753 m (5' 9\")   Wt 115.9 kg (255 lb 9.6 oz)   SpO2 98%   BMI 37.75 kg/m²   Subjective:     Past Medical History:   Diagnosis Date    Diabetes (HCC)     Hayfever     Hypertension     Obesity     Pneumonia 1985 or 1986    Prostate cancer (HCC)     [id number] CaP 3+3    Sleep apnea     DAKOTAH (stress urinary incontinence), male      Past Surgical History:   Procedure Laterality Date    APPENDECTOMY  5/1977    BIOPSY PROSTATE  03/02/2011    COLONOSCOPY  12/2019    COLONOSCOPY N/A 08/08/2023    Colonoscopy withn polypectomy performed by Ayush Scales MD at [facility] ENDOSCOPY    HERNIA REPAIR  12/01/2015    [place] APPENDECTOMY  05/01/1977    PROSTATECTOMY  05/06/2011    VASECTOMY  1990 or 1991     Social History     Socioeconomic History    Marital status:      Spouse name: None    Number of children: None    Years of education: None    Highest education level: None   Tobacco Use    Smoking status: Never    Smokeless tobacco: Never   Vaping Use    Vaping Use: Never used   Substance and Sexual Activity    Alcohol use: Yes     Alcohol/week: 1.0 standard drink of alcohol     Types: 1 Cans of beer per week    Drug use: No    Sexual activity: Not Currently     Partners: Female     Social

## 2024-03-18 NOTE — PROGRESS NOTES
Jb Merrillyd presents today for   Chief Complaint   Patient presents with    Follow-up       Is someone accompanying this pt? no    Is the patient using any DME equipment during OV? no    Depression Screening:      3/18/2024     5:01 PM 5/8/2023     6:13 PM 10/20/2022     5:15 PM 9/27/2022     3:57 PM 4/4/2022     4:35 PM   PHQ-9 Questionaire   Little interest or pleasure in doing things 0 0 0 0 0   Feeling down, depressed, or hopeless 0 0 0 0 2   PHQ-9 Total Score 0 0 0 0 2       Fall Risk      5/8/2023     6:13 PM   Fall Risk   2 or more falls in past year? no   Fall with injury in past year? no        Health Maintenance reviewed and discussed and ordered per Provider.    There are no preventive care reminders to display for this patient..        \"Have you been to the ER, urgent care clinic since your last visit?  Hospitalized since your last visit?\"    NO    “Have you seen or consulted any other health care providers outside of [facility] since your last visit?”    NO

## 2024-05-29 ENCOUNTER — HOSPITAL ENCOUNTER (OUTPATIENT)
Age: 69
Discharge: HOME | End: 2024-06-01
Payer: MEDICARE

## 2024-05-29 DIAGNOSIS — Z00.00 WELLNESS EXAMINATION: ICD-10-CM

## 2024-05-29 DIAGNOSIS — E11.65 TYPE 2 DIABETES MELLITUS WITH HYPERGLYCEMIA, WITHOUT LONG-TERM CURRENT USE OF INSULIN (HCC): ICD-10-CM

## 2024-05-29 LAB
ALBUMIN SERPL-MCNC: 3.9 G/DL (ref 3.4–5)
ALBUMIN/GLOB SERPL: 1.1 (ref 0.8–1.7)
ALP SERPL-CCNC: 78 U/L (ref 45–117)
ALT SERPL-CCNC: 80 U/L (ref 16–61)
ANION GAP SERPL CALC-SCNC: 7 MMOL/L (ref 3–18)
AST SERPL W P-5'-P-CCNC: 34 U/L (ref 10–38)
BILIRUB SERPL-MCNC: 0.8 MG/DL (ref 0.2–1)
BUN SERPL-MCNC: 15 MG/DL (ref 7–18)
BUN/CREAT SERPL: 12 (ref 12–20)
CA-I BLD-MCNC: 9.4 MG/DL (ref 8.5–10.1)
CHLORIDE SERPL-SCNC: 102 MMOL/L (ref 100–111)
CO2 SERPL-SCNC: 30 MMOL/L (ref 21–32)
CREAT SERPL-MCNC: 1.21 MG/DL (ref 0.6–1.3)
ERYTHROCYTE [DISTWIDTH] IN BLOOD BY AUTOMATED COUNT: 13.4 % (ref 11.6–14.5)
GLOBULIN SER CALC-MCNC: 3.7 G/DL (ref 2–4)
GLUCOSE SERPL-MCNC: 90 MG/DL (ref 74–99)
HCT VFR BLD AUTO: 45.8 % (ref 36–48)
HGB BLD-MCNC: 15.6 G/DL (ref 13–16)
MCH RBC QN AUTO: 30.2 PG (ref 24–34)
MCHC RBC AUTO-ENTMCNC: 34.1 G/DL (ref 31–37)
MCV RBC AUTO: 88.6 FL (ref 78–100)
NRBC # BLD: 0 K/UL (ref 0–0.01)
NRBC BLD-RTO: 0 PER 100 WBC
PLATELET # BLD AUTO: 266 K/UL (ref 135–420)
PMV BLD AUTO: 9.7 FL (ref 9.2–11.8)
POTASSIUM SERPL-SCNC: 3.7 MMOL/L (ref 3.5–5.5)
PROT SERPL-MCNC: 7.6 G/DL (ref 6.4–8.2)
RBC # BLD AUTO: 5.17 M/UL (ref 4.35–5.65)
SODIUM SERPL-SCNC: 139 MMOL/L (ref 136–145)
TSH SERPL DL<=0.05 MIU/L-ACNC: 2.1 UIU/ML (ref 0.36–3.74)
WBC # BLD AUTO: 11.2 K/UL (ref 4.6–13.2)

## 2024-05-29 PROCEDURE — 80053 COMPREHEN METABOLIC PANEL: CPT

## 2024-05-29 PROCEDURE — 82306 VITAMIN D 25 HYDROXY: CPT

## 2024-05-29 PROCEDURE — 85027 COMPLETE CBC AUTOMATED: CPT

## 2024-05-29 PROCEDURE — 83036 HEMOGLOBIN GLYCOSYLATED A1C: CPT

## 2024-05-29 PROCEDURE — 36415 COLL VENOUS BLD VENIPUNCTURE: CPT

## 2024-05-29 PROCEDURE — 82570 ASSAY OF URINE CREATININE: CPT

## 2024-05-29 PROCEDURE — 84443 ASSAY THYROID STIM HORMONE: CPT

## 2024-05-29 PROCEDURE — G0103 PSA SCREENING: HCPCS

## 2024-05-29 PROCEDURE — 82607 VITAMIN B-12: CPT

## 2024-05-29 PROCEDURE — 80061 LIPID PANEL: CPT

## 2024-05-29 PROCEDURE — 82043 UR ALBUMIN QUANTITATIVE: CPT

## 2024-05-30 LAB
25(OH)D3 SERPL-MCNC: 31.1 NG/ML (ref 30–100)
CHOLEST SERPL-MCNC: 109 MG/DL
CREAT UR-MCNC: 158 MG/DL (ref 30–125)
EST. AVERAGE GLUCOSE BLD GHB EST-MCNC: 117 MG/DL
HBA1C MFR BLD: 5.7 % (ref 4.2–5.6)
HDLC SERPL-MCNC: 29 MG/DL (ref 40–60)
HDLC SERPL: 3.8 (ref 0–5)
LDLC SERPL CALC-MCNC: 42.4 MG/DL (ref 0–100)
LIPID PANEL: ABNORMAL
MICROALBUMIN UR-MCNC: 2.57 MG/DL (ref 0–3)
MICROALBUMIN/CREAT UR-RTO: 16 MGMALB/GCRE (ref 0–30)
PSA SERPL-MCNC: 0 NG/ML (ref 0–4)
TRIGL SERPL-MCNC: 188 MG/DL
VIT B12 SERPL-MCNC: 314 PG/ML (ref 211–911)
VLDLC SERPL CALC-MCNC: 37.6 MG/DL

## 2024-06-04 ENCOUNTER — TELEPHONE (OUTPATIENT)
Dept: FAMILY MEDICINE CLINIC | Facility: CLINIC | Age: 69
End: 2024-06-04

## 2024-06-04 NOTE — TELEPHONE ENCOUNTER
Correction to my last message sent to you just now; he is to d/c any tylenol (I said nsaids by mistake) thank you

## 2024-06-24 RX ORDER — ATORVASTATIN CALCIUM 40 MG/1
TABLET, FILM COATED ORAL
Qty: 90 TABLET | Refills: 3 | Status: SHIPPED | OUTPATIENT
Start: 2024-06-24

## 2024-07-21 ASSESSMENT — LIFESTYLE VARIABLES
HOW OFTEN DO YOU HAVE SIX OR MORE DRINKS ON ONE OCCASION: 1
HOW OFTEN DO YOU HAVE A DRINK CONTAINING ALCOHOL: 2
HOW MANY STANDARD DRINKS CONTAINING ALCOHOL DO YOU HAVE ON A TYPICAL DAY: 1

## 2024-07-22 ENCOUNTER — OFFICE VISIT (OUTPATIENT)
Dept: FAMILY MEDICINE CLINIC | Facility: CLINIC | Age: 69
End: 2024-07-22
Payer: MEDICARE

## 2024-07-22 VITALS
OXYGEN SATURATION: 96 % | TEMPERATURE: 97 F | DIASTOLIC BLOOD PRESSURE: 78 MMHG | WEIGHT: 258.6 LBS | BODY MASS INDEX: 38.3 KG/M2 | HEIGHT: 69 IN | SYSTOLIC BLOOD PRESSURE: 113 MMHG | HEART RATE: 87 BPM

## 2024-07-22 DIAGNOSIS — Z00.00 MEDICARE ANNUAL WELLNESS VISIT, SUBSEQUENT: Primary | ICD-10-CM

## 2024-07-22 DIAGNOSIS — I10 ESSENTIAL (PRIMARY) HYPERTENSION: ICD-10-CM

## 2024-07-22 PROCEDURE — 3017F COLORECTAL CA SCREEN DOC REV: CPT | Performed by: NURSE PRACTITIONER

## 2024-07-22 PROCEDURE — G8427 DOCREV CUR MEDS BY ELIG CLIN: HCPCS | Performed by: NURSE PRACTITIONER

## 2024-07-22 PROCEDURE — 99213 OFFICE O/P EST LOW 20 MIN: CPT | Performed by: NURSE PRACTITIONER

## 2024-07-22 PROCEDURE — G0439 PPPS, SUBSEQ VISIT: HCPCS | Performed by: NURSE PRACTITIONER

## 2024-07-22 PROCEDURE — 1036F TOBACCO NON-USER: CPT | Performed by: NURSE PRACTITIONER

## 2024-07-22 PROCEDURE — 3074F SYST BP LT 130 MM HG: CPT | Performed by: NURSE PRACTITIONER

## 2024-07-22 PROCEDURE — 3078F DIAST BP <80 MM HG: CPT | Performed by: NURSE PRACTITIONER

## 2024-07-22 PROCEDURE — 1124F ACP DISCUSS-NO DSCNMKR DOCD: CPT | Performed by: NURSE PRACTITIONER

## 2024-07-22 PROCEDURE — G8417 CALC BMI ABV UP PARAM F/U: HCPCS | Performed by: NURSE PRACTITIONER

## 2024-07-22 NOTE — PROGRESS NOTES
Jbkaren Turner presents today for   Chief Complaint   Patient presents with    Medicare AWV    Follow-up       Is someone accompanying this pt? no    Is the patient using any DME equipment during OV? no    Depression Screenin/21/2024     1:15 PM 2024     7:06 PM 3/18/2024     5:01 PM 2023     6:13 PM 10/20/2022     5:15 PM 2022     3:57 PM 2022     4:35 PM   PHQ-9 Questionaire   Little interest or pleasure in doing things 0 0 0 0 0 0 0   Feeling down, depressed, or hopeless 0 0 0 0 0 0 2   PHQ-9 Total Score 0 0    0 0 0 0 0 2       Fall Risk      2024     1:15 PM 2024     7:06 PM 2023     6:13 PM   Fall Risk   Do you feel unsteady or are you worried about falling?  no no no   2 or more falls in past year? no no no   Fall with injury in past year? no no no        Health Maintenance reviewed and discussed and ordered per Provider.    Health Maintenance Due   Topic Date Due    Diabetic foot exam  2024    Diabetic retinal exam  2024   .        \"Have you been to the ER, urgent care clinic since your last visit?  Hospitalized since your last visit?\"    NO    “Have you seen or consulted any other health care providers outside of [facility] since your last visit?”    NO    windy Hu

## 2024-07-22 NOTE — PROGRESS NOTES
Jb Turner is a 68 y.o. male presents with   Chief Complaint   Patient presents with    Medicare AW    Follow-up        Diagnosis   1. Medicare annual wellness visit, subsequent       2. Essential (primary) hypertension    Blood pressure is improved patient currently on lisinopril 40 mg once daily hydrochlorothiazide 25 mg once daily     /78 (Site: Right Upper Arm, Position: Sitting, Cuff Size: Large Adult)   Pulse 87   Temp 97 °F (36.1 °C) (Temporal)   Ht 1.753 m (5' 9\")   Wt 117.3 kg (258 lb 9.6 oz)   SpO2 96%   BMI 38.19 kg/m²   Subjective:     Past Medical History:   Diagnosis Date    Diabetes (HCC)     Hayfever     Hypertension     Obesity     Pneumonia 1985 or 1986    Prostate cancer (HCC)     [id number] CaP 3+3    Sleep apnea     DAKOTAH (stress urinary incontinence), male      Past Surgical History:   Procedure Laterality Date    APPENDECTOMY  5/1977    BIOPSY PROSTATE  03/02/2011    COLONOSCOPY  12/2019    COLONOSCOPY N/A 08/08/2023    Colonoscopy withn polypectomy performed by Ayush Scales MD at [facility] ENDOSCOPY    HERNIA REPAIR  12/01/2015    [place] APPENDECTOMY  05/01/1977    PROSTATECTOMY  05/06/2011    VASECTOMY  1990 or 1991     Social History     Socioeconomic History    Marital status:      Spouse name: None    Number of children: None    Years of education: None    Highest education level: None   Tobacco Use    Smoking status: Never    Smokeless tobacco: Never   Vaping Use    Vaping Use: Never used   Substance and Sexual Activity    Alcohol use: Yes     Alcohol/week: 1.0 standard drink of alcohol     Types: 1 Cans of beer per week    Drug use: No    Sexual activity: Not Currently     Partners: Female     Social Determinants of Health     Financial Resource Strain: Low Risk  (5/8/2023)    Overall Financial Resource Strain (CARDIA)     Difficulty of Paying Living Expenses: Not hard at all   Transportation Needs: Unknown (5/8/2023)    PRAPARE - Transportation     Lack of

## 2024-07-29 ENCOUNTER — PATIENT MESSAGE (OUTPATIENT)
Dept: FAMILY MEDICINE CLINIC | Facility: CLINIC | Age: 69
End: 2024-07-29

## 2024-08-05 ENCOUNTER — TELEPHONE (OUTPATIENT)
Dept: FAMILY MEDICINE CLINIC | Facility: CLINIC | Age: 69
End: 2024-08-05

## 2024-08-05 RX ORDER — LISINOPRIL 20 MG/1
20 TABLET ORAL DAILY
Qty: 90 TABLET | Refills: 1 | Status: SHIPPED | OUTPATIENT
Start: 2024-08-05

## 2024-08-05 NOTE — TELEPHONE ENCOUNTER
----- Message from JAMEY Varela CNP sent at 8/5/2024  4:06 PM EDT -----  Regarding: RE: BP without  lisinopril   Contact: [phone]  I agree lisinopril 20 mg once daily please send in Rx and have him update me on his readings thank you  ----- Message -----  From: Breanne Self  Sent: 7/29/2024   7:20 AM EDT  To: JAMEY Varela CNP  Subject: FW: BP without  lisinopril                         ----- Message -----  From: Jb Turner  Sent: 7/29/2024   7:08 AM EDT  To: [facility] Clinical Staff  Subject: BP without  lisinopril                           7-23.  116/76.  143/83  7-24.  130/84.  134/74  7-25.  134/83.   148/80  7-26.  131/81.   149/94  7-27.  125/87.   145/88  7-28.   136/96.  138/91    So I think 20mg might be the right thing to do.   What do you think is best?    Thanks,   Jb Turner

## 2024-09-02 ENCOUNTER — PATIENT MESSAGE (OUTPATIENT)
Dept: FAMILY MEDICINE CLINIC | Facility: CLINIC | Age: 69
End: 2024-09-02

## 2024-09-06 RX ORDER — LISINOPRIL 10 MG/1
10 TABLET ORAL DAILY
Qty: 90 TABLET | Refills: 1 | Status: SHIPPED | OUTPATIENT
Start: 2024-09-06

## 2024-10-17 RX ORDER — HYDROCHLOROTHIAZIDE 25 MG/1
25 TABLET ORAL DAILY
Qty: 90 TABLET | Refills: 3 | Status: SHIPPED | OUTPATIENT
Start: 2024-10-17

## 2024-12-06 RX ORDER — SEMAGLUTIDE 2.68 MG/ML
INJECTION, SOLUTION SUBCUTANEOUS
Qty: 9 ML | Refills: 3 | Status: SHIPPED | OUTPATIENT
Start: 2024-12-06

## 2024-12-10 ENCOUNTER — TELEPHONE (OUTPATIENT)
Dept: FAMILY MEDICINE CLINIC | Facility: CLINIC | Age: 69
End: 2024-12-10

## 2024-12-10 NOTE — TELEPHONE ENCOUNTER
Pt called and said that [facility] hasn't filled his Ozempic because they need more info. They said there's a lapse in therapy. The pt said that he had ran out one time and that's probably why it's been a while since he last filled. Can you give [facility] a phone call when you get a chance?

## 2024-12-31 RX ORDER — LISINOPRIL 20 MG/1
20 TABLET ORAL 2 TIMES DAILY
Qty: 180 TABLET | Refills: 1 | Status: SHIPPED | OUTPATIENT
Start: 2024-12-31

## 2025-01-23 ENCOUNTER — HOSPITAL ENCOUNTER (OUTPATIENT)
Age: 70
Setting detail: SPECIMEN
Discharge: HOME | End: 2025-01-26

## 2025-01-23 DIAGNOSIS — E55.9 VITAMIN D DEFICIENCY: ICD-10-CM

## 2025-01-23 DIAGNOSIS — E53.8 VITAMIN B12 DEFICIENCY: ICD-10-CM

## 2025-01-23 DIAGNOSIS — E66.01 OBESITY, MORBID: ICD-10-CM

## 2025-01-23 DIAGNOSIS — E78.2 MIXED HYPERLIPIDEMIA: ICD-10-CM

## 2025-01-23 DIAGNOSIS — E11.65 TYPE 2 DIABETES MELLITUS WITH HYPERGLYCEMIA, WITHOUT LONG-TERM CURRENT USE OF INSULIN (HCC): ICD-10-CM

## 2025-01-23 DIAGNOSIS — I10 HYPERTENSION, UNSPECIFIED TYPE: Primary | ICD-10-CM

## 2025-01-23 DIAGNOSIS — Z85.46 HISTORY OF PROSTATE CANCER: ICD-10-CM

## 2025-01-23 LAB — LABCORP DRAW FEE: NORMAL

## 2025-01-23 PROCEDURE — 99001 SPECIMEN HANDLING PT-LAB: CPT

## 2025-01-24 LAB
25(OH)D3+25(OH)D2 SERPL-MCNC: 33.7 NG/ML (ref 30–100)
ALBUMIN SERPL-MCNC: 4.6 G/DL (ref 3.9–4.9)
ALBUMIN/CREAT UR: 10 MG/G CREAT (ref 0–29)
ALP SERPL-CCNC: 87 IU/L (ref 44–121)
ALT SERPL-CCNC: 46 IU/L (ref 0–44)
AST SERPL-CCNC: 24 IU/L (ref 0–40)
BASOPHILS # BLD AUTO: 0.1 X10E3/UL (ref 0–0.2)
BASOPHILS NFR BLD AUTO: 1 %
BILIRUB SERPL-MCNC: 0.6 MG/DL (ref 0–1.2)
BUN SERPL-MCNC: 12 MG/DL (ref 8–27)
BUN/CREAT SERPL: 10 (ref 10–24)
CALCIUM SERPL-MCNC: 9.7 MG/DL (ref 8.6–10.2)
CHLORIDE SERPL-SCNC: 98 MMOL/L (ref 96–106)
CHOLEST SERPL-MCNC: 94 MG/DL (ref 100–199)
CO2 SERPL-SCNC: 24 MMOL/L (ref 20–29)
CREAT SERPL-MCNC: 1.16 MG/DL (ref 0.76–1.27)
CREAT UR-MCNC: 184.2 MG/DL
EGFRCR SERPLBLD CKD-EPI 2021: 69 ML/MIN/1.73
EOSINOPHIL # BLD AUTO: 0.2 X10E3/UL (ref 0–0.4)
EOSINOPHIL NFR BLD AUTO: 2 %
ERYTHROCYTE [DISTWIDTH] IN BLOOD BY AUTOMATED COUNT: 12.5 % (ref 11.6–15.4)
GLOBULIN SER CALC-MCNC: 2.4 G/DL (ref 1.5–4.5)
GLUCOSE SERPL-MCNC: 83 MG/DL (ref 70–99)
HBA1C MFR BLD: 6.4 % (ref 4.8–5.6)
HCT VFR BLD AUTO: 47.1 % (ref 37.5–51)
HDLC SERPL-MCNC: 25 MG/DL
HGB BLD-MCNC: 15.6 G/DL (ref 13–17.7)
IMM GRANULOCYTES # BLD AUTO: 0 X10E3/UL (ref 0–0.1)
IMM GRANULOCYTES NFR BLD AUTO: 0 %
LDLC SERPL CALC-MCNC: 37 MG/DL (ref 0–99)
LYMPHOCYTES # BLD AUTO: 4 X10E3/UL (ref 0.7–3.1)
LYMPHOCYTES NFR BLD AUTO: 35 %
MCH RBC QN AUTO: 30.4 PG (ref 26.6–33)
MCHC RBC AUTO-ENTMCNC: 33.1 G/DL (ref 31.5–35.7)
MCV RBC AUTO: 92 FL (ref 79–97)
MICROALBUMIN UR-MCNC: 18 UG/ML
MONOCYTES # BLD AUTO: 0.9 X10E3/UL (ref 0.1–0.9)
MONOCYTES NFR BLD AUTO: 8 %
NEUTROPHILS # BLD AUTO: 6.4 X10E3/UL (ref 1.4–7)
NEUTROPHILS NFR BLD AUTO: 54 %
PLATELET # BLD AUTO: 279 X10E3/UL (ref 150–450)
POTASSIUM SERPL-SCNC: 4.1 MMOL/L (ref 3.5–5.2)
PROT SERPL-MCNC: 7 G/DL (ref 6–8.5)
PSA SERPL-MCNC: <0.1 NG/ML (ref 0–4)
RBC # BLD AUTO: 5.14 X10E6/UL (ref 4.14–5.8)
SODIUM SERPL-SCNC: 141 MMOL/L (ref 134–144)
SPECIMEN STATUS REPORT: NORMAL
TRIGL SERPL-MCNC: 197 MG/DL (ref 0–149)
TSH SERPL DL<=0.005 MIU/L-ACNC: 1.46 UIU/ML (ref 0.45–4.5)
VIT B12 SERPL-MCNC: 377 PG/ML (ref 232–1245)
VLDLC SERPL CALC-MCNC: 32 MG/DL (ref 5–40)
WBC # BLD AUTO: 11.6 X10E3/UL (ref 3.4–10.8)

## 2025-01-26 SDOH — ECONOMIC STABILITY: TRANSPORTATION INSECURITY
IN THE PAST 12 MONTHS, HAS LACK OF TRANSPORTATION KEPT YOU FROM MEETINGS, WORK, OR FROM GETTING THINGS NEEDED FOR DAILY LIVING?: NO

## 2025-01-26 SDOH — ECONOMIC STABILITY: INCOME INSECURITY: IN THE LAST 12 MONTHS, WAS THERE A TIME WHEN YOU WERE NOT ABLE TO PAY THE MORTGAGE OR RENT ON TIME?: NO

## 2025-01-26 SDOH — ECONOMIC STABILITY: FOOD INSECURITY: WITHIN THE PAST 12 MONTHS, THE FOOD YOU BOUGHT JUST DIDN'T LAST AND YOU DIDN'T HAVE MONEY TO GET MORE.: NEVER TRUE

## 2025-01-26 SDOH — HEALTH STABILITY: PHYSICAL HEALTH: ON AVERAGE, HOW MANY MINUTES DO YOU ENGAGE IN EXERCISE AT THIS LEVEL?: 30 MIN

## 2025-01-26 SDOH — ECONOMIC STABILITY: FOOD INSECURITY: WITHIN THE PAST 12 MONTHS, YOU WORRIED THAT YOUR FOOD WOULD RUN OUT BEFORE YOU GOT MONEY TO BUY MORE.: NEVER TRUE

## 2025-01-26 SDOH — ECONOMIC STABILITY: TRANSPORTATION INSECURITY
IN THE PAST 12 MONTHS, HAS THE LACK OF TRANSPORTATION KEPT YOU FROM MEDICAL APPOINTMENTS OR FROM GETTING MEDICATIONS?: NO

## 2025-01-26 SDOH — HEALTH STABILITY: PHYSICAL HEALTH: ON AVERAGE, HOW MANY DAYS PER WEEK DO YOU ENGAGE IN MODERATE TO STRENUOUS EXERCISE (LIKE A BRISK WALK)?: 5 DAYS

## 2025-01-26 ASSESSMENT — LIFESTYLE VARIABLES
HOW MANY STANDARD DRINKS CONTAINING ALCOHOL DO YOU HAVE ON A TYPICAL DAY: 1
HOW OFTEN DO YOU HAVE A DRINK CONTAINING ALCOHOL: 2
HOW MANY STANDARD DRINKS CONTAINING ALCOHOL DO YOU HAVE ON A TYPICAL DAY: 1 OR 2
HOW OFTEN DO YOU HAVE A DRINK CONTAINING ALCOHOL: MONTHLY OR LESS
HOW OFTEN DO YOU HAVE SIX OR MORE DRINKS ON ONE OCCASION: 1

## 2025-01-26 ASSESSMENT — PATIENT HEALTH QUESTIONNAIRE - PHQ9
1. LITTLE INTEREST OR PLEASURE IN DOING THINGS: NOT AT ALL
2. FEELING DOWN, DEPRESSED OR HOPELESS: NOT AT ALL
SUM OF ALL RESPONSES TO PHQ QUESTIONS 1-9: 0
SUM OF ALL RESPONSES TO PHQ9 QUESTIONS 1 & 2: 0
SUM OF ALL RESPONSES TO PHQ QUESTIONS 1-9: 0

## 2025-01-28 ENCOUNTER — OFFICE VISIT (OUTPATIENT)
Dept: FAMILY MEDICINE CLINIC | Facility: CLINIC | Age: 70
End: 2025-01-28

## 2025-01-28 VITALS
DIASTOLIC BLOOD PRESSURE: 80 MMHG | TEMPERATURE: 97.2 F | SYSTOLIC BLOOD PRESSURE: 121 MMHG | HEART RATE: 83 BPM | RESPIRATION RATE: 19 BRPM | HEIGHT: 69 IN | BODY MASS INDEX: 37.2 KG/M2 | WEIGHT: 251.2 LBS | OXYGEN SATURATION: 95 %

## 2025-01-28 DIAGNOSIS — N52.1 ERECTILE DYSFUNCTION DUE TO DISEASES CLASSIFIED ELSEWHERE: ICD-10-CM

## 2025-01-28 DIAGNOSIS — E11.9 TYPE 2 DIABETES MELLITUS WITHOUT COMPLICATION, WITHOUT LONG-TERM CURRENT USE OF INSULIN (HCC): ICD-10-CM

## 2025-01-28 DIAGNOSIS — Z00.00 MEDICARE ANNUAL WELLNESS VISIT, SUBSEQUENT: ICD-10-CM

## 2025-01-28 DIAGNOSIS — Z63.79 STRESSFUL LIFE EVENT AFFECTING FAMILY: Primary | ICD-10-CM

## 2025-01-28 ASSESSMENT — ENCOUNTER SYMPTOMS
SHORTNESS OF BREATH: 0
WHEEZING: 0
CHEST TIGHTNESS: 0

## 2025-01-28 NOTE — PROGRESS NOTES
to diseases classified elsewhere  -     External Referral To Urology  Medicare annual wellness visit, subsequent  Type 2 diabetes mellitus without complication, without long-term current use of insulin (HCC)       No follow-ups on file.     Subjective       Patient's complete Health Risk Assessment and screening values have been reviewed and are found in Flowsheets. The following problems were reviewed today and where indicated follow up appointments were made and/or referrals ordered.    Positive Risk Factor Screenings with Interventions:                Abnormal BMI (obese):  Body mass index is 37.1 kg/m². (!) Abnormal  Interventions:  See AVS for additional education material             Advanced Directives:  Do you have a Living Will?: (!) No    Intervention:  has NO advanced directive - information provided                     Objective   Vitals:    01/28/25 1433   BP: 121/80   Site: Left Upper Arm   Position: Sitting   Cuff Size: Large Adult   Pulse: 83   Resp: 19   Temp: 97.2 °F (36.2 °C)   TempSrc: Temporal   SpO2: 95%   Weight: 113.9 kg (251 lb 3.2 oz)   Height: 1.753 m (5' 9\")      Body mass index is 37.1 kg/m².                    Allergies   Allergen Reactions    Other Other (See Comments)     Seasonal allergies     Prior to Visit Medications    Medication Sig Taking? Authorizing Provider   lisinopril (PRINIVIL;ZESTRIL) 20 MG tablet Take 1 tablet by mouth in the morning and at bedtime Yes Dena Barton APRN - CNP   OZEMPIC, 2 MG/DOSE, 8 MG/3ML SOPN sc injection INJECT 2 MG UNDER THE SKIN EVERY 7 DAYS Yes Dena Barton APRN - CNP   metFORMIN (GLUCOPHAGE) 1000 MG tablet TAKE 1 TABLET WITH BREAKFAST AND WITH EVENING MEAL Yes Dena Barton APRN - CNP   hydroCHLOROthiazide (HYDRODIURIL) 25 MG tablet TAKE 1 TABLET DAILY Yes Dena Barton APRN - CNP   atorvastatin (LIPITOR) 40 MG tablet TAKE 1 TABLET DAILY Yes Dena Barton APRN - CNP   Multiple Vitamins-Minerals (ONE-A-DAY MENS 50+) TABS Take 1 tablet by mouth

## 2025-03-27 ENCOUNTER — TELEPHONE (OUTPATIENT)
Dept: FAMILY MEDICINE CLINIC | Facility: CLINIC | Age: 70
End: 2025-03-27
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
## 2025-04-21 ENCOUNTER — OFFICE VISIT (OUTPATIENT)
Dept: FAMILY MEDICINE CLINIC | Facility: CLINIC | Age: 70
End: 2025-04-21
Payer: MEDICARE

## 2025-04-21 VITALS
DIASTOLIC BLOOD PRESSURE: 78 MMHG | HEART RATE: 70 BPM | BODY MASS INDEX: 36.43 KG/M2 | SYSTOLIC BLOOD PRESSURE: 129 MMHG | OXYGEN SATURATION: 98 % | TEMPERATURE: 97.7 F | HEIGHT: 69 IN | WEIGHT: 246 LBS | RESPIRATION RATE: 18 BRPM

## 2025-04-21 DIAGNOSIS — T75.4XXD ELECTROCUTION AND NONFATAL EFFECTS OF ELECTRIC CURRENT, SUBSEQUENT ENCOUNTER: Primary | ICD-10-CM

## 2025-04-21 PROCEDURE — 93000 ELECTROCARDIOGRAM COMPLETE: CPT | Performed by: NURSE PRACTITIONER

## 2025-04-21 PROCEDURE — G8427 DOCREV CUR MEDS BY ELIG CLIN: HCPCS | Performed by: NURSE PRACTITIONER

## 2025-04-21 PROCEDURE — 3074F SYST BP LT 130 MM HG: CPT | Performed by: NURSE PRACTITIONER

## 2025-04-21 PROCEDURE — 3017F COLORECTAL CA SCREEN DOC REV: CPT | Performed by: NURSE PRACTITIONER

## 2025-04-21 PROCEDURE — 1124F ACP DISCUSS-NO DSCNMKR DOCD: CPT | Performed by: NURSE PRACTITIONER

## 2025-04-21 PROCEDURE — 3078F DIAST BP <80 MM HG: CPT | Performed by: NURSE PRACTITIONER

## 2025-04-21 PROCEDURE — 99213 OFFICE O/P EST LOW 20 MIN: CPT | Performed by: NURSE PRACTITIONER

## 2025-04-21 PROCEDURE — G8417 CALC BMI ABV UP PARAM F/U: HCPCS | Performed by: NURSE PRACTITIONER

## 2025-04-21 PROCEDURE — 1036F TOBACCO NON-USER: CPT | Performed by: NURSE PRACTITIONER
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
## 2025-04-28 ASSESSMENT — ENCOUNTER SYMPTOMS
CHEST TIGHTNESS: 0
WHEEZING: 0
SHORTNESS OF BREATH: 0

## 2025-07-08 RX ORDER — LISINOPRIL 20 MG/1
20 TABLET ORAL 2 TIMES DAILY
Qty: 180 TABLET | Refills: 1 | Status: SHIPPED | OUTPATIENT
Start: 2025-07-08 | End: 2025-07-10

## 2025-07-10 RX ORDER — LISINOPRIL 20 MG/1
20 TABLET ORAL 2 TIMES DAILY
Qty: 180 TABLET | Refills: 1 | Status: SHIPPED | OUTPATIENT
Start: 2025-07-10

## 2025-07-14 ENCOUNTER — PATIENT MESSAGE (OUTPATIENT)
Dept: FAMILY MEDICINE CLINIC | Facility: CLINIC | Age: 70
End: 2025-07-14

## 2025-07-14 DIAGNOSIS — E11.9 TYPE 2 DIABETES MELLITUS WITHOUT COMPLICATION, WITHOUT LONG-TERM CURRENT USE OF INSULIN (HCC): Primary | ICD-10-CM

## 2025-07-22 ENCOUNTER — HOSPITAL ENCOUNTER (OUTPATIENT)
Age: 70
Setting detail: SPECIMEN
Discharge: HOME | End: 2025-07-25

## 2025-07-22 LAB — LABCORP DRAW FEE: NORMAL

## 2025-07-22 PROCEDURE — 99001 SPECIMEN HANDLING PT-LAB: CPT

## 2025-07-24 LAB
25(OH)D3+25(OH)D2 SERPL-MCNC: 29.6 NG/ML (ref 30–100)
ALBUMIN SERPL-MCNC: 4.4 G/DL (ref 3.9–4.9)
ALBUMIN/CREAT UR: 9 MG/G CREAT (ref 0–29)
ALP SERPL-CCNC: 83 IU/L (ref 44–121)
ALT SERPL-CCNC: 58 IU/L (ref 0–44)
AST SERPL-CCNC: 28 IU/L (ref 0–40)
BASOPHILS # BLD AUTO: 0 X10E3/UL (ref 0–0.2)
BASOPHILS NFR BLD AUTO: 0 %
BILIRUB SERPL-MCNC: 0.8 MG/DL (ref 0–1.2)
BUN SERPL-MCNC: 16 MG/DL (ref 8–27)
BUN/CREAT SERPL: 14 (ref 10–24)
CALCIUM SERPL-MCNC: 9.5 MG/DL (ref 8.6–10.2)
CHLORIDE SERPL-SCNC: 99 MMOL/L (ref 96–106)
CHOLEST SERPL-MCNC: 101 MG/DL (ref 100–199)
CO2 SERPL-SCNC: 26 MMOL/L (ref 20–29)
CREAT SERPL-MCNC: 1.13 MG/DL (ref 0.76–1.27)
CREAT UR-MCNC: 206.8 MG/DL
EGFRCR SERPLBLD CKD-EPI 2021: 71 ML/MIN/1.73
EOSINOPHIL # BLD AUTO: 0.1 X10E3/UL (ref 0–0.4)
EOSINOPHIL NFR BLD AUTO: 1 %
ERYTHROCYTE [DISTWIDTH] IN BLOOD BY AUTOMATED COUNT: 12.6 % (ref 11.6–15.4)
GLOBULIN SER CALC-MCNC: 3 G/DL (ref 1.5–4.5)
GLUCOSE SERPL-MCNC: 106 MG/DL (ref 70–99)
HBA1C MFR BLD: 6.1 % (ref 4.8–5.6)
HCT VFR BLD AUTO: 49.3 % (ref 37.5–51)
HDLC SERPL-MCNC: 23 MG/DL
HGB BLD-MCNC: 16.4 G/DL (ref 13–17.7)
IMM GRANULOCYTES # BLD AUTO: 0.2 X10E3/UL (ref 0–0.1)
IMM GRANULOCYTES NFR BLD AUTO: 2 %
LDLC SERPL CALC-MCNC: 37 MG/DL (ref 0–99)
LYMPHOCYTES # BLD AUTO: 3 X10E3/UL (ref 0.7–3.1)
LYMPHOCYTES NFR BLD AUTO: 30 %
MCH RBC QN AUTO: 30.8 PG (ref 26.6–33)
MCHC RBC AUTO-ENTMCNC: 33.3 G/DL (ref 31.5–35.7)
MCV RBC AUTO: 93 FL (ref 79–97)
MICROALBUMIN UR-MCNC: 18.9 UG/ML
MONOCYTES # BLD AUTO: 1.3 X10E3/UL (ref 0.1–0.9)
MONOCYTES NFR BLD AUTO: 13 %
NEUTROPHILS # BLD AUTO: 5.5 X10E3/UL (ref 1.4–7)
NEUTROPHILS NFR BLD AUTO: 54 %
PLATELET # BLD AUTO: 238 X10E3/UL (ref 150–450)
POTASSIUM SERPL-SCNC: 4.4 MMOL/L (ref 3.5–5.2)
PROT SERPL-MCNC: 7.4 G/DL (ref 6–8.5)
RBC # BLD AUTO: 5.32 X10E6/UL (ref 4.14–5.8)
SODIUM SERPL-SCNC: 141 MMOL/L (ref 134–144)
SPECIMEN STATUS REPORT: NORMAL
TRIGL SERPL-MCNC: 264 MG/DL (ref 0–149)
VLDLC SERPL CALC-MCNC: 41 MG/DL (ref 5–40)
WBC # BLD AUTO: 10 X10E3/UL (ref 3.4–10.8)

## 2025-08-06 RX ORDER — HYDROCHLOROTHIAZIDE 25 MG/1
25 TABLET ORAL DAILY
Qty: 90 TABLET | Refills: 3 | Status: SHIPPED | OUTPATIENT
Start: 2025-08-06

## 2025-08-25 ENCOUNTER — OFFICE VISIT (OUTPATIENT)
Dept: FAMILY MEDICINE CLINIC | Facility: CLINIC | Age: 70
End: 2025-08-25
Payer: OTHER GOVERNMENT

## 2025-08-25 VITALS
SYSTOLIC BLOOD PRESSURE: 134 MMHG | WEIGHT: 262 LBS | TEMPERATURE: 98.5 F | DIASTOLIC BLOOD PRESSURE: 82 MMHG | HEIGHT: 69 IN | RESPIRATION RATE: 18 BRPM | HEART RATE: 87 BPM | OXYGEN SATURATION: 98 % | BODY MASS INDEX: 38.8 KG/M2

## 2025-08-25 DIAGNOSIS — E11.9 TYPE 2 DIABETES MELLITUS WITHOUT COMPLICATION, WITHOUT LONG-TERM CURRENT USE OF INSULIN (HCC): Primary | ICD-10-CM

## 2025-08-25 PROCEDURE — 3017F COLORECTAL CA SCREEN DOC REV: CPT | Performed by: NURSE PRACTITIONER

## 2025-08-25 PROCEDURE — 2022F DILAT RTA XM EVC RTNOPTHY: CPT | Performed by: NURSE PRACTITIONER

## 2025-08-25 PROCEDURE — G8417 CALC BMI ABV UP PARAM F/U: HCPCS | Performed by: NURSE PRACTITIONER

## 2025-08-25 PROCEDURE — G8427 DOCREV CUR MEDS BY ELIG CLIN: HCPCS | Performed by: NURSE PRACTITIONER

## 2025-08-25 PROCEDURE — 3044F HG A1C LEVEL LT 7.0%: CPT | Performed by: NURSE PRACTITIONER

## 2025-08-25 PROCEDURE — 99213 OFFICE O/P EST LOW 20 MIN: CPT | Performed by: NURSE PRACTITIONER

## 2025-08-25 PROCEDURE — 1124F ACP DISCUSS-NO DSCNMKR DOCD: CPT | Performed by: NURSE PRACTITIONER

## 2025-08-25 PROCEDURE — 3079F DIAST BP 80-89 MM HG: CPT | Performed by: NURSE PRACTITIONER

## 2025-08-25 PROCEDURE — 1036F TOBACCO NON-USER: CPT | Performed by: NURSE PRACTITIONER

## 2025-08-25 PROCEDURE — 3075F SYST BP GE 130 - 139MM HG: CPT | Performed by: NURSE PRACTITIONER

## (undated) DEVICE — ENDOGATOR TUBING FOR BOSTON SCIENTIFIC ENDOSTAT II PUMP, OLYMPUS OFP PUMP OR ENDO STRATUS PUMP: Brand: ENDOGATOR

## (undated) DEVICE — Device: Brand: DEFENDO VALVE AND CONNECTOR KIT

## (undated) DEVICE — SOLUTION IRRIG 1000ML STRL H2O USP PLAS POUR BTL

## (undated) DEVICE — SOLUTION IRRIG 500ML STRL H2O NONPYROGENIC

## (undated) DEVICE — TUBING, SUCTION, 9/32" X 10', STRAIGHT: Brand: MEDLINE

## (undated) DEVICE — SNARE ENDOSCP L240CM SHTH DIA24MM LOOP W10MM POLYP RND REINF

## (undated) DEVICE — TUBING INSUFFLATION CAP W/ EXT CARBON DIOX ENDO SMARTCAP

## (undated) DEVICE — TRAP SURG QUAD PARABOLA SLOT DSGN SFTY SCRN TRAPEASE

## (undated) DEVICE — KIT COLON W/ 1.1OZ LUB AND 2 END